# Patient Record
Sex: FEMALE | Race: NATIVE HAWAIIAN OR OTHER PACIFIC ISLANDER | Employment: FULL TIME | URBAN - METROPOLITAN AREA
[De-identification: names, ages, dates, MRNs, and addresses within clinical notes are randomized per-mention and may not be internally consistent; named-entity substitution may affect disease eponyms.]

---

## 2021-07-14 ENCOUNTER — APPOINTMENT (OUTPATIENT)
Dept: GENERAL RADIOLOGY | Age: 51
DRG: 243 | End: 2021-07-14
Payer: COMMERCIAL

## 2021-07-14 ENCOUNTER — HOSPITAL ENCOUNTER (INPATIENT)
Age: 51
LOS: 3 days | Discharge: HOME | DRG: 243 | End: 2021-07-17
Attending: EMERGENCY MEDICINE | Admitting: INTERNAL MEDICINE
Payer: COMMERCIAL

## 2021-07-14 ENCOUNTER — APPOINTMENT (OUTPATIENT)
Dept: CT IMAGING | Age: 51
DRG: 243 | End: 2021-07-14
Payer: COMMERCIAL

## 2021-07-14 DIAGNOSIS — R07.9 ACUTE CHEST PAIN: Primary | ICD-10-CM

## 2021-07-14 LAB
A/G RATIO: 1.2 (ref 1.1–2.2)
ALBUMIN SERPL-MCNC: 3.7 G/DL (ref 3.4–5)
ALP BLD-CCNC: 154 U/L (ref 40–129)
ALT SERPL-CCNC: 18 U/L (ref 10–40)
ANION GAP SERPL CALCULATED.3IONS-SCNC: 7 MMOL/L (ref 3–16)
AST SERPL-CCNC: 29 U/L (ref 15–37)
BASOPHILS ABSOLUTE: 0 K/UL (ref 0–0.2)
BASOPHILS RELATIVE PERCENT: 0.7 %
BILIRUB SERPL-MCNC: 0.3 MG/DL (ref 0–1)
BUN BLDV-MCNC: 10 MG/DL (ref 7–20)
CALCIUM SERPL-MCNC: 8.8 MG/DL (ref 8.3–10.6)
CHLORIDE BLD-SCNC: 107 MMOL/L (ref 99–110)
CO2: 26 MMOL/L (ref 21–32)
CREAT SERPL-MCNC: 0.6 MG/DL (ref 0.6–1.1)
D DIMER: 332 NG/ML DDU (ref 0–229)
EOSINOPHILS ABSOLUTE: 0.1 K/UL (ref 0–0.6)
EOSINOPHILS RELATIVE PERCENT: 1.3 %
GFR AFRICAN AMERICAN: >60
GFR NON-AFRICAN AMERICAN: >60
GLOBULIN: 3.2 G/DL
GLUCOSE BLD-MCNC: 113 MG/DL (ref 70–99)
HCT VFR BLD CALC: 37.7 % (ref 36–48)
HEMOGLOBIN: 11.9 G/DL (ref 12–16)
LYMPHOCYTES ABSOLUTE: 2.3 K/UL (ref 1–5.1)
LYMPHOCYTES RELATIVE PERCENT: 45.9 %
MCH RBC QN AUTO: 26.8 PG (ref 26–34)
MCHC RBC AUTO-ENTMCNC: 31.7 G/DL (ref 31–36)
MCV RBC AUTO: 84.8 FL (ref 80–100)
MONOCYTES ABSOLUTE: 0.6 K/UL (ref 0–1.3)
MONOCYTES RELATIVE PERCENT: 11.3 %
NEUTROPHILS ABSOLUTE: 2 K/UL (ref 1.7–7.7)
NEUTROPHILS RELATIVE PERCENT: 40.8 %
PDW BLD-RTO: 14 % (ref 12.4–15.4)
PLATELET # BLD: 178 K/UL (ref 135–450)
PMV BLD AUTO: 8.5 FL (ref 5–10.5)
POTASSIUM REFLEX MAGNESIUM: 3.9 MMOL/L (ref 3.5–5.1)
PRO-BNP: 198 PG/ML (ref 0–124)
RBC # BLD: 4.45 M/UL (ref 4–5.2)
SODIUM BLD-SCNC: 140 MMOL/L (ref 136–145)
TOTAL PROTEIN: 6.9 G/DL (ref 6.4–8.2)
TROPONIN: <0.01 NG/ML
WBC # BLD: 5 K/UL (ref 4–11)

## 2021-07-14 PROCEDURE — 99285 EMERGENCY DEPT VISIT HI MDM: CPT

## 2021-07-14 PROCEDURE — 71260 CT THORAX DX C+: CPT

## 2021-07-14 PROCEDURE — 85025 COMPLETE CBC W/AUTO DIFF WBC: CPT

## 2021-07-14 PROCEDURE — 93005 ELECTROCARDIOGRAM TRACING: CPT | Performed by: EMERGENCY MEDICINE

## 2021-07-14 PROCEDURE — 6360000004 HC RX CONTRAST MEDICATION: Performed by: EMERGENCY MEDICINE

## 2021-07-14 PROCEDURE — 84484 ASSAY OF TROPONIN QUANT: CPT

## 2021-07-14 PROCEDURE — 1200000000 HC SEMI PRIVATE

## 2021-07-14 PROCEDURE — 83880 ASSAY OF NATRIURETIC PEPTIDE: CPT

## 2021-07-14 PROCEDURE — 6370000000 HC RX 637 (ALT 250 FOR IP): Performed by: EMERGENCY MEDICINE

## 2021-07-14 PROCEDURE — 71045 X-RAY EXAM CHEST 1 VIEW: CPT

## 2021-07-14 PROCEDURE — 80053 COMPREHEN METABOLIC PANEL: CPT

## 2021-07-14 PROCEDURE — 85379 FIBRIN DEGRADATION QUANT: CPT

## 2021-07-14 PROCEDURE — 36415 COLL VENOUS BLD VENIPUNCTURE: CPT

## 2021-07-14 RX ORDER — NAPROXEN 500 MG/1
500 TABLET ORAL 2 TIMES DAILY WITH MEALS
COMMUNITY
End: 2021-09-23 | Stop reason: SDUPTHER

## 2021-07-14 RX ORDER — ASPIRIN 81 MG/1
324 TABLET, CHEWABLE ORAL ONCE
Status: COMPLETED | OUTPATIENT
Start: 2021-07-14 | End: 2021-07-14

## 2021-07-14 RX ORDER — LISINOPRIL 10 MG/1
10 TABLET ORAL DAILY
COMMUNITY
End: 2021-07-29 | Stop reason: SDUPTHER

## 2021-07-14 RX ADMIN — NITROGLYCERIN 1 INCH: 20 OINTMENT TOPICAL at 16:54

## 2021-07-14 RX ADMIN — ASPIRIN 324 MG: 81 TABLET, CHEWABLE ORAL at 16:54

## 2021-07-14 RX ADMIN — IOPAMIDOL 80 ML: 755 INJECTION, SOLUTION INTRAVENOUS at 18:08

## 2021-07-14 ASSESSMENT — HEART SCORE: ECG: 1

## 2021-07-14 ASSESSMENT — PAIN DESCRIPTION - PAIN TYPE
TYPE: ACUTE PAIN
TYPE: ACUTE PAIN

## 2021-07-14 ASSESSMENT — PAIN DESCRIPTION - LOCATION
LOCATION: CHEST
LOCATION: CHEST

## 2021-07-14 ASSESSMENT — PAIN SCALES - GENERAL
PAINLEVEL_OUTOF10: 6
PAINLEVEL_OUTOF10: 10

## 2021-07-14 ASSESSMENT — PAIN DESCRIPTION - DESCRIPTORS
DESCRIPTORS: CONSTANT
DESCRIPTORS: DISCOMFORT

## 2021-07-14 ASSESSMENT — PAIN DESCRIPTION - ORIENTATION: ORIENTATION: LEFT

## 2021-07-14 NOTE — ED PROVIDER NOTES
The [address] [place] [address] Emergency Department    CHIEF COMPLAINT  Chief Complaint   Patient presents with    Chest Pain      HISTORY OF PRESENT Yvette Barrett is a 45 y.o. female  who presents to the ED complaining of lightheadedness and chest pain intermittently for a month but constant all day today. She says it causes her to feel SOB. She may have passed out one time recently but not today. No fevers. No leg swelling currently but sometimes her hands/legs swell up. She has easy fatigability compared to normal the past month as well. H/o HTN, unsure if diabetic, and and has been told she has high cholesterol in the past.  Not really coughing or having abdominal pains, no vomiting or diarrhea. Describes the chest pain as central and left sided chest pressure without radiation. No other complaints, modifying factors or associated symptoms. I have reviewed the following from the nursing documentation. Past Medical History:   Diagnosis Date    Anemia     Hypertension      Past Surgical History:   Procedure Laterality Date    HYSTERECTOMY       History reviewed. No pertinent family history.   Social History     Socioeconomic History    Marital status: Single     Spouse name: Not on file    Number of children: Not on file    Years of education: Not on file    Highest education level: Not on file   Occupational History    Not on file   Tobacco Use    Smoking status: Never Smoker    Smokeless tobacco: Current User   Substance and Sexual Activity    Alcohol use: Not Currently    Drug use: Yes     Types: Marijuana    Sexual activity: Not on file   Other Topics Concern    Not on file   Social History Narrative    Not on file     Social Determinants of Health     Financial Resource Strain:     Difficulty of Paying Living Expenses:    Food Insecurity:     Worried About Running Out of Food in the Last Year:     [address] in the Last Year:    Transportation Needs:     Lack of Transportation (Medical):  Lack of Transportation (Non-Medical):    Physical Activity:     Days of Exercise per Week:     Minutes of Exercise per Session:    Stress:     Feeling of Stress :    Social Connections:     Frequency of Communication with Friends and Family:     Frequency of Social Gatherings with Friends and Family:     Attends Temple Services:     Active Member of Clubs or Organizations:     Attends Club or Organization Meetings:     Marital Status:    Intimate Partner Violence:     Fear of Current or Ex-Partner:     Emotionally Abused:     Physically Abused:     Sexually Abused:      No current facility-administered medications for this encounter. Current Outpatient Medications   Medication Sig Dispense Refill    lisinopril (PRINIVIL;ZESTRIL) 10 MG tablet Take 10 mg by mouth daily      naproxen (NAPROSYN) 500 MG tablet Take 500 mg by mouth 2 times daily (with meals)       No Known Allergies    REVIEW OF SYSTEMS  10 systems reviewed, pertinent positives per HPI otherwise noted to be negative. PHYSICAL EXAM  /81   Pulse 57   Temp 98.8 °F (37.1 °C) (Oral)   Resp 19   Ht 4' 9\" (1.448 m)   Wt 150 lb (68 kg)   SpO2 97%   BMI 32.46 kg/m²    GENERAL APPEARANCE: Awake and alert. Cooperative. No distress. HENT: Normocephalic. Atraumatic. Mucous membranes are dry. NECK: Supple. EYES: PERRL. EOM's grossly intact. HEART/CHEST: RRR. No murmurs. No chest wall tenderness. LUNGS: Respirations unlabored. CTAB. Good air exchange. Speaking comfortably in full sentences. ABDOMEN: No tenderness. Soft. Non-distended. No masses. No organomegaly. No guarding or rebound. Normal bowel sounds throughout. MUSCULOSKELETAL: No extremity edema. Compartments soft. No deformity. No tenderness in the extremities. All extremities neurovascularly intact. SKIN: Warm and dry. No acute rashes. NEUROLOGICAL: Alert and oriented. CN's 2-12 intact. No gross facial drooping. Strength 5/5, sensation intact. 2 plus DTR's in knees bilaterally. Gait normal.  PSYCHIATRIC: Normal mood and affect. LABS  I have reviewed all labs for this visit.    Results for orders placed or performed during the hospital encounter of 07/14/21   CBC Auto Differential   Result Value Ref Range    WBC 5.0 4.0 - 11.0 K/uL    RBC 4.45 4.00 - 5.20 M/uL    Hemoglobin 11.9 (L) 12.0 - 16.0 g/dL    Hematocrit 37.7 36.0 - 48.0 %    MCV 84.8 80.0 - 100.0 fL    MCH 26.8 26.0 - 34.0 pg    MCHC 31.7 31.0 - 36.0 g/dL    RDW 14.0 12.4 - 15.4 %    Platelets 794 208 - 497 K/uL    MPV 8.5 5.0 - 10.5 fL    Neutrophils % 40.8 %    Lymphocytes % 45.9 %    Monocytes % 11.3 %    Eosinophils % 1.3 %    Basophils % 0.7 %    Neutrophils Absolute 2.0 1.7 - 7.7 K/uL    Lymphocytes Absolute 2.3 1.0 - 5.1 K/uL    Monocytes Absolute 0.6 0.0 - 1.3 K/uL    Eosinophils Absolute 0.1 0.0 - 0.6 K/uL    Basophils Absolute 0.0 0.0 - 0.2 K/uL   Comprehensive Metabolic Panel w/ Reflex to MG   Result Value Ref Range    Sodium 140 136 - 145 mmol/L    Potassium reflex Magnesium 3.9 3.5 - 5.1 mmol/L    Chloride 107 99 - 110 mmol/L    CO2 26 21 - 32 mmol/L    Anion Gap 7 3 - 16    Glucose 113 (H) 70 - 99 mg/dL    BUN 10 7 - 20 mg/dL    CREATININE 0.6 0.6 - 1.1 mg/dL    GFR Non-African American >60 >60    GFR African American >60 >60    Calcium 8.8 8.3 - 10.6 mg/dL    Total Protein 6.9 6.4 - 8.2 g/dL    Albumin 3.7 3.4 - 5.0 g/dL    Albumin/Globulin Ratio 1.2 1.1 - 2.2    Total Bilirubin 0.3 0.0 - 1.0 mg/dL    Alkaline Phosphatase 154 (H) 40 - 129 U/L    ALT 18 10 - 40 U/L    AST 29 15 - 37 U/L    Globulin 3.2 g/dL   Troponin   Result Value Ref Range    Troponin <0.01 <0.01 ng/mL   Brain Natriuretic Peptide   Result Value Ref Range    Pro- (H) 0 - 124 pg/mL   D-Dimer, Quantitative   Result Value Ref Range    D-Dimer, Quant 332 (H) 0 - 229 ng/mL DDU   EKG 12 Lead   Result Value Ref Range    Ventricular Rate 60 BPM    Atrial Rate 60 BPM    P-R Interval 126 ms    QRS Duration 92 ms    Q-T Interval 436 ms    QTc Calculation (Bazett) 436 ms    P Axis -37 degrees    R Axis 17 degrees    T Axis 50 degrees    Diagnosis       Unusual P axis, possible ectopic atrial rhythmAbnormal ECG       The 12 lead EKG was interpreted by me as follows:  Rate: normal with a rate of 60  Rhythm: sinus (unusual p axis vs artifact)  Axis: normal  Intervals: normal MA, narrow QRS, normal QTc  ST segments: no ST elevations or depressions  T waves: no abnormal inversions  Non-specific T wave changes: present  Prior EKG comparison: No prior is currently available for comparison    RADIOLOGY    XR CHEST PORTABLE    Result Date: 7/14/2021  Chest portable. HISTORY: Chest pain. 1640 hours Heart size is normal No focal consolidation or edema. Artifact at the thoracic inlet. Degenerative change both shoulders     No consolidation    CT CHEST PULMONARY EMBOLISM W CONTRAST    Result Date: 7/14/2021  EXAM: CT ANGIOGRAPHY CHEST WITH CONTRAST (PULMONARY EMBOLUS PROTOCOL) INDICATION: CP SOB +ddimer, Chest pain COMPARISON: None. TECHNIQUE: Axial CT imaging obtained through the chest using CT pulmonary angiogram protocol. Axial images, multiplanar reformatted images and maximum intensity projection images were reviewed for CT angiographic technique. Individualized dose optimization technique was used in order to meet ALARA standards for radiation dose reduction. In addition to vendor specific dose reduction algorithms, the dose reduction techniques vary based on the specific scanner utilized but frequently include automated exposure control, adjustment of the mA and/or kV according to patient size, and use of iterative reconstruction technique. IV Contrast Media and volume: 80 mL Isovue-370 FINDINGS: DIAGNOSTIC QUALITY: Adequate. PULMONARY EMBOLI: None. RIGHT HEART STRAIN: None. LUNGS AND AIRWAYS: Airways are patent. Patchy groundglass opacities in bilateral lower lobes.  PLEURA: No pleural effusions or significant pleural thickening. HEART / GREAT VESSELS: Normal heart size. No pericardial effusion. Great vessels are normal caliber. ADENOPATHY: None. CHEST WALL / LOWER NECK: No significant abnormality. UPPER ABDOMEN: No acute abnormality. Yazmin Sharper BONES: No destructive process. Mild degenerative changes spine. 1. No evidence of pulmonary embolus. 2. Patchy groundglass opacities in bilateral lower lobes which may relate to atelectasis or pneumonitis. ED COURSE/MDM  Patient seen and evaluated. Old records reviewed. Labs and imaging reviewed and results discussed with patient. After initial evaluation, differential diagnostic considerations included: acute coronary syndrome, pulmonary embolism, COPD/asthma, pneumonia, musculoskeletal, reflux/PUD/gastritis, pneumothorax, CHF, thoracic aortic dissection, anxiety    The patient's ED workup was notable for concern for intermittent but progressive chest discomfort with lightheadedness and some shortness of breath. Her symptomatology is not really consistent with an acute infectious process. Her D-dimer is mildly elevated but chest x-ray is clear. Subsequent CT scan shows no PE. There is patchy groundglass opacities in the bilateral lower lobes which is likely atelectasis, initial troponin negative, EKG nonspecific. Will admit based on elevated heart score. Symptoms improved with nitroglycerin and aspirin, furthering concern for anginal etiology. HEART SCORE:    History: 2  EC  Patient Age: 1  *Risk factors for Atherosclerotic disease: Hypertension; Hypercholesterolemia;Obesity  Risk Factors: 2  Troponin: 0  Heart Score Total: 6      Heart score: 6.   This falls under the following category: Score of 4-6, which indicates low/moderate risk for major adverse cardiac event and supports observation with repeated troponins and/or non-invasive testing      During the patient's ED course, the patient was given:  Medications   aspirin chewable tablet 324 mg (324 mg Oral Given 7/14/21 1654)   nitroglycerin (NITRO-BID) 2 % ointment 1 inch (1 inch Topical Given 7/14/21 1654)   iopamidol (ISOVUE-370) 76 % injection 80 mL (80 mLs Intravenous Given 7/14/21 1808)        CLINICAL IMPRESSION  1. Acute chest pain        Blood pressure 121/81, pulse 57, temperature 98.8 °F (37.1 °C), temperature source Oral, resp. rate 19, height 4' 9\" (1.448 m), weight 150 lb (68 kg), SpO2 97 %. Kaushal Barrett was admitted in fair condition. The plan is to admit to the hospital at this time under the hospitalist service. Dr. Homer Quintero, hospitalist accepted the patient and will take over the patient's care. DISCLAIMER: This chart was created using Dragon dictation software. Efforts were made by me to ensure accuracy, however some errors may be present due to limitations of this technology and occasionally words are not transcribed correctly.         Maya Coello MD  07/14/21 Natalia Amaro MD  07/14/21 [id number]

## 2021-07-14 NOTE — LETTER
[facility]  [address]  Phone: [phone]    No name on file. July 17, 2021     Patient: Camelia Clemons   YOB: 1970   Date of Visit: 7/14/2021       To Whom It May Concern: It is my medical opinion that Charmaine Mccrary may return to light duty immediately with the following restrictions: lifting/carrying not to exceed 10 lbs. for at least 1 month and until restriction lifted by her doctor/ medical advice. If you have any questions or concerns, please don't hesitate to call.       Sincerely,        Amy Short MD

## 2021-07-14 NOTE — ED NOTES
Pt having long pauses on monitor and when RN went to check on pt she states that at this time she is pain free although still feels sob. Will make MD aware.      Luis Crooks RN  07/14/21 9857

## 2021-07-14 NOTE — ED NOTES
Pt states that she has been having chest pain and sob for the past 2 weeks along with dizziness and feeling like she was going to pass out. Pt states that it happens more when she is at work and does have a history of anemia and a heart murmer. Pt denies any nausea at this time.      Erum Purdy RN  07/14/21 [id number]

## 2021-07-15 PROBLEM — R00.1 BRADYCARDIA: Status: ACTIVE | Noted: 2021-07-15

## 2021-07-15 LAB
A/G RATIO: 0.9 (ref 1.1–2.2)
ALBUMIN SERPL-MCNC: 3.3 G/DL (ref 3.4–5)
ALP BLD-CCNC: 163 U/L (ref 40–129)
ALT SERPL-CCNC: 18 U/L (ref 10–40)
ANION GAP SERPL CALCULATED.3IONS-SCNC: 7 MMOL/L (ref 3–16)
AST SERPL-CCNC: 25 U/L (ref 15–37)
BASOPHILS ABSOLUTE: 0 K/UL (ref 0–0.2)
BASOPHILS RELATIVE PERCENT: 0.7 %
BILIRUB SERPL-MCNC: <0.2 MG/DL (ref 0–1)
BUN BLDV-MCNC: 11 MG/DL (ref 7–20)
C-REACTIVE PROTEIN: <3 MG/L (ref 0–5.1)
CALCIUM SERPL-MCNC: 8.3 MG/DL (ref 8.3–10.6)
CHLORIDE BLD-SCNC: 106 MMOL/L (ref 99–110)
CHOLESTEROL, TOTAL: 189 MG/DL (ref 0–199)
CO2: 26 MMOL/L (ref 21–32)
CREAT SERPL-MCNC: 0.7 MG/DL (ref 0.6–1.1)
EKG ATRIAL RATE: 60 BPM
EKG ATRIAL RATE: 63 BPM
EKG DIAGNOSIS: NORMAL
EKG DIAGNOSIS: NORMAL
EKG P AXIS: -37 DEGREES
EKG P-R INTERVAL: 126 MS
EKG P-R INTERVAL: 144 MS
EKG Q-T INTERVAL: 436 MS
EKG Q-T INTERVAL: 444 MS
EKG QRS DURATION: 100 MS
EKG QRS DURATION: 92 MS
EKG QTC CALCULATION (BAZETT): 436 MS
EKG QTC CALCULATION (BAZETT): 454 MS
EKG R AXIS: 17 DEGREES
EKG R AXIS: 26 DEGREES
EKG T AXIS: 50 DEGREES
EKG T AXIS: 59 DEGREES
EKG VENTRICULAR RATE: 60 BPM
EKG VENTRICULAR RATE: 63 BPM
EOSINOPHILS ABSOLUTE: 0.1 K/UL (ref 0–0.6)
EOSINOPHILS RELATIVE PERCENT: 2 %
FERRITIN: 130.6 NG/ML (ref 15–150)
FOLATE: 6.93 NG/ML (ref 4.78–24.2)
GAMMA GLUTAMYL TRANSFERASE: 31 U/L (ref 5–36)
GFR AFRICAN AMERICAN: >60
GFR NON-AFRICAN AMERICAN: >60
GLOBULIN: 3.6 G/DL
GLUCOSE BLD-MCNC: 118 MG/DL (ref 70–99)
HCG QUALITATIVE: NEGATIVE
HCT VFR BLD CALC: 36.9 % (ref 36–48)
HDLC SERPL-MCNC: 54 MG/DL (ref 40–60)
HEMOGLOBIN: 12.1 G/DL (ref 12–16)
IRON SATURATION: 35 % (ref 15–50)
IRON: 86 UG/DL (ref 37–145)
LDL CHOLESTEROL CALCULATED: 103 MG/DL
LYMPHOCYTES ABSOLUTE: 2.1 K/UL (ref 1–5.1)
LYMPHOCYTES RELATIVE PERCENT: 42.2 %
MAGNESIUM: 2.1 MG/DL (ref 1.8–2.4)
MCH RBC QN AUTO: 28 PG (ref 26–34)
MCHC RBC AUTO-ENTMCNC: 32.8 G/DL (ref 31–36)
MCV RBC AUTO: 85.4 FL (ref 80–100)
MONOCYTES ABSOLUTE: 0.5 K/UL (ref 0–1.3)
MONOCYTES RELATIVE PERCENT: 10.8 %
NEUTROPHILS ABSOLUTE: 2.2 K/UL (ref 1.7–7.7)
NEUTROPHILS RELATIVE PERCENT: 44.3 %
PDW BLD-RTO: 14.1 % (ref 12.4–15.4)
PHOSPHORUS: 3.3 MG/DL (ref 2.5–4.9)
PLATELET # BLD: 167 K/UL (ref 135–450)
PMV BLD AUTO: 8.6 FL (ref 5–10.5)
POTASSIUM REFLEX MAGNESIUM: 3.8 MMOL/L (ref 3.5–5.1)
PROCALCITONIN: 0.03 NG/ML (ref 0–0.15)
RBC # BLD: 4.33 M/UL (ref 4–5.2)
SARS-COV-2, NAAT: NOT DETECTED
SEDIMENTATION RATE, ERYTHROCYTE: >120 MM/HR (ref 0–30)
SODIUM BLD-SCNC: 139 MMOL/L (ref 136–145)
TOTAL IRON BINDING CAPACITY: 249 UG/DL (ref 260–445)
TOTAL PROTEIN: 6.9 G/DL (ref 6.4–8.2)
TRIGL SERPL-MCNC: 162 MG/DL (ref 0–150)
TROPONIN: <0.01 NG/ML
TSH REFLEX: 2.5 UIU/ML (ref 0.27–4.2)
VITAMIN B-12: 324 PG/ML (ref 211–911)
VLDLC SERPL CALC-MCNC: 32 MG/DL
WBC # BLD: 5 K/UL (ref 4–11)

## 2021-07-15 PROCEDURE — 93010 ELECTROCARDIOGRAM REPORT: CPT | Performed by: INTERNAL MEDICINE

## 2021-07-15 PROCEDURE — 6370000000 HC RX 637 (ALT 250 FOR IP): Performed by: INTERNAL MEDICINE

## 2021-07-15 PROCEDURE — 82746 ASSAY OF FOLIC ACID SERUM: CPT

## 2021-07-15 PROCEDURE — 84100 ASSAY OF PHOSPHORUS: CPT

## 2021-07-15 PROCEDURE — 99254 IP/OBS CNSLTJ NEW/EST MOD 60: CPT | Performed by: INTERNAL MEDICINE

## 2021-07-15 PROCEDURE — 84145 PROCALCITONIN (PCT): CPT

## 2021-07-15 PROCEDURE — 36415 COLL VENOUS BLD VENIPUNCTURE: CPT

## 2021-07-15 PROCEDURE — 94150 VITAL CAPACITY TEST: CPT

## 2021-07-15 PROCEDURE — 80053 COMPREHEN METABOLIC PANEL: CPT

## 2021-07-15 PROCEDURE — 2580000003 HC RX 258: Performed by: INTERNAL MEDICINE

## 2021-07-15 PROCEDURE — 85652 RBC SED RATE AUTOMATED: CPT

## 2021-07-15 PROCEDURE — 93005 ELECTROCARDIOGRAM TRACING: CPT | Performed by: INTERNAL MEDICINE

## 2021-07-15 PROCEDURE — 82607 VITAMIN B-12: CPT

## 2021-07-15 PROCEDURE — 82977 ASSAY OF GGT: CPT

## 2021-07-15 PROCEDURE — 6360000002 HC RX W HCPCS: Performed by: INTERNAL MEDICINE

## 2021-07-15 PROCEDURE — 84443 ASSAY THYROID STIM HORMONE: CPT

## 2021-07-15 PROCEDURE — 80061 LIPID PANEL: CPT

## 2021-07-15 PROCEDURE — 83540 ASSAY OF IRON: CPT

## 2021-07-15 PROCEDURE — 82728 ASSAY OF FERRITIN: CPT

## 2021-07-15 PROCEDURE — 83550 IRON BINDING TEST: CPT

## 2021-07-15 PROCEDURE — 85025 COMPLETE CBC W/AUTO DIFF WBC: CPT

## 2021-07-15 PROCEDURE — 86140 C-REACTIVE PROTEIN: CPT

## 2021-07-15 PROCEDURE — 2060000000 HC ICU INTERMEDIATE R&B

## 2021-07-15 PROCEDURE — 83036 HEMOGLOBIN GLYCOSYLATED A1C: CPT

## 2021-07-15 PROCEDURE — 87635 SARS-COV-2 COVID-19 AMP PRB: CPT

## 2021-07-15 PROCEDURE — 83735 ASSAY OF MAGNESIUM: CPT

## 2021-07-15 PROCEDURE — 84703 CHORIONIC GONADOTROPIN ASSAY: CPT

## 2021-07-15 PROCEDURE — 84484 ASSAY OF TROPONIN QUANT: CPT

## 2021-07-15 RX ORDER — SODIUM CHLORIDE 0.9 % (FLUSH) 0.9 %
10 SYRINGE (ML) INJECTION EVERY 12 HOURS SCHEDULED
Status: DISCONTINUED | OUTPATIENT
Start: 2021-07-15 | End: 2021-07-17 | Stop reason: HOSPADM

## 2021-07-15 RX ORDER — ATROPINE SULFATE 0.4 MG/ML
0.4 AMPUL (ML) INJECTION
Status: DISPENSED | OUTPATIENT
Start: 2021-07-15 | End: 2021-07-15

## 2021-07-15 RX ORDER — SODIUM CHLORIDE 0.9 % (FLUSH) 0.9 %
10 SYRINGE (ML) INJECTION PRN
Status: DISCONTINUED | OUTPATIENT
Start: 2021-07-15 | End: 2021-07-17 | Stop reason: HOSPADM

## 2021-07-15 RX ORDER — ACETAMINOPHEN 650 MG/1
650 SUPPOSITORY RECTAL EVERY 6 HOURS PRN
Status: DISCONTINUED | OUTPATIENT
Start: 2021-07-15 | End: 2021-07-17 | Stop reason: HOSPADM

## 2021-07-15 RX ORDER — ONDANSETRON 2 MG/ML
4 INJECTION INTRAMUSCULAR; INTRAVENOUS EVERY 6 HOURS PRN
Status: DISCONTINUED | OUTPATIENT
Start: 2021-07-15 | End: 2021-07-17 | Stop reason: HOSPADM

## 2021-07-15 RX ORDER — PROMETHAZINE HYDROCHLORIDE 12.5 MG/1
12.5 TABLET ORAL EVERY 6 HOURS PRN
Status: DISCONTINUED | OUTPATIENT
Start: 2021-07-15 | End: 2021-07-17 | Stop reason: HOSPADM

## 2021-07-15 RX ORDER — LISINOPRIL 10 MG/1
10 TABLET ORAL DAILY
Status: DISCONTINUED | OUTPATIENT
Start: 2021-07-15 | End: 2021-07-17 | Stop reason: HOSPADM

## 2021-07-15 RX ORDER — MAGNESIUM SULFATE IN WATER 40 MG/ML
2000 INJECTION, SOLUTION INTRAVENOUS PRN
Status: DISCONTINUED | OUTPATIENT
Start: 2021-07-15 | End: 2021-07-17 | Stop reason: HOSPADM

## 2021-07-15 RX ORDER — ACETAMINOPHEN 325 MG/1
650 TABLET ORAL EVERY 6 HOURS PRN
Status: DISCONTINUED | OUTPATIENT
Start: 2021-07-15 | End: 2021-07-17 | Stop reason: HOSPADM

## 2021-07-15 RX ORDER — MORPHINE SULFATE 2 MG/ML
2 INJECTION, SOLUTION INTRAMUSCULAR; INTRAVENOUS EVERY 4 HOURS PRN
Status: DISCONTINUED | OUTPATIENT
Start: 2021-07-15 | End: 2021-07-17 | Stop reason: HOSPADM

## 2021-07-15 RX ORDER — NITROGLYCERIN 0.4 MG/1
0.4 TABLET SUBLINGUAL EVERY 5 MIN PRN
Status: DISCONTINUED | OUTPATIENT
Start: 2021-07-15 | End: 2021-07-17 | Stop reason: HOSPADM

## 2021-07-15 RX ORDER — ASPIRIN 81 MG/1
81 TABLET, CHEWABLE ORAL DAILY
Status: DISCONTINUED | OUTPATIENT
Start: 2021-07-15 | End: 2021-07-17 | Stop reason: HOSPADM

## 2021-07-15 RX ORDER — SODIUM CHLORIDE 9 MG/ML
25 INJECTION, SOLUTION INTRAVENOUS PRN
Status: DISCONTINUED | OUTPATIENT
Start: 2021-07-15 | End: 2021-07-16 | Stop reason: SDUPTHER

## 2021-07-15 RX ORDER — SODIUM CHLORIDE 9 MG/ML
INJECTION, SOLUTION INTRAVENOUS CONTINUOUS
Status: ACTIVE | OUTPATIENT
Start: 2021-07-15 | End: 2021-07-15

## 2021-07-15 RX ORDER — BENZONATATE 100 MG/1
200 CAPSULE ORAL 3 TIMES DAILY
Status: DISCONTINUED | OUTPATIENT
Start: 2021-07-15 | End: 2021-07-17 | Stop reason: HOSPADM

## 2021-07-15 RX ORDER — POTASSIUM CHLORIDE 7.45 MG/ML
10 INJECTION INTRAVENOUS PRN
Status: DISCONTINUED | OUTPATIENT
Start: 2021-07-15 | End: 2021-07-17 | Stop reason: HOSPADM

## 2021-07-15 RX ADMIN — Medication 10 ML: at 09:41

## 2021-07-15 RX ADMIN — Medication 10 ML: at 21:45

## 2021-07-15 RX ADMIN — ACETAMINOPHEN 650 MG: 325 TABLET, FILM COATED ORAL at 02:04

## 2021-07-15 RX ADMIN — BENZONATATE 200 MG: 100 CAPSULE ORAL at 09:41

## 2021-07-15 RX ADMIN — LISINOPRIL 10 MG: 10 TABLET ORAL at 09:41

## 2021-07-15 RX ADMIN — ASPIRIN 81 MG: 81 TABLET, CHEWABLE ORAL at 11:47

## 2021-07-15 RX ADMIN — BENZONATATE 200 MG: 100 CAPSULE ORAL at 20:28

## 2021-07-15 RX ADMIN — BENZONATATE 200 MG: 100 CAPSULE ORAL at 02:02

## 2021-07-15 RX ADMIN — ENOXAPARIN SODIUM 40 MG: 40 INJECTION SUBCUTANEOUS at 05:03

## 2021-07-15 RX ADMIN — SODIUM CHLORIDE: 9 INJECTION, SOLUTION INTRAVENOUS at 11:47

## 2021-07-15 ASSESSMENT — PAIN SCALES - GENERAL
PAINLEVEL_OUTOF10: 2
PAINLEVEL_OUTOF10: 0

## 2021-07-15 NOTE — PROGRESS NOTES
MITCH Attending    Seen and admitted earlier today by my colleague, Dr. Gilbert Comer    Pxt seen and examined    Chart and data reviewed      Admitted with chest pain    Works at [address] had intermittent exertional left precordial chest pain with associated dizziness and dyspnea    No smoking hx  Started on lisinopril few months ago at [facility] for HTN  Family hx of heart disease, but no early cardiac death hx    EKG shows no acute ST- T wave changes    Has also been noted with intermittent bradycardia, with HR down to the 30s,  apparent  junctional rhythm with at least one episode of pause 3.32 sec. Unknown if she was symptomatic, but admits to having had episodes of dizziness since admission. Ruled out for Acute MI    TSH: Pending  Bed rest for now  Avoid AVN agents  Atropine PRN    Hold ACEI for now. Avoid hypotension  Hydrate. COVID testing  Stress test ordered:  Will change to nuclear stress test and obtain echo  Cardiology (EP) eval.    Rest as per admit note    Discussed with OMA Segal MD

## 2021-07-15 NOTE — PROGRESS NOTES
4 Eyes Admission Assessment     I agree as the admission nurse that 2 RN's have performed a thorough Head to Toe Skin Assessment on the patient. ALL assessment sites listed below have been assessed on admission. Areas assessed by both nurses:   [x]   Head, Face, and Ears   [x]   Shoulders, Back, and Chest  [x]   Arms, Elbows, and Hands   [x]   Coccyx, Sacrum, and Ischium  [x]   Legs, Feet, and Heels        Does the Patient have Skin Breakdown? No         Eric Prevention initiated:  No   Wound Care Orders initiated:  No      [facility] nurse consulted for Pressure Injury (Stage 3,4, Unstageable, DTI, NWPT, and Complex wounds) or Eric score 18 or lower:  No    Pt has scattered bruises.      Nurse 1 eSignature: Electronically signed by Aldo Jacobo RN on 7/15/21 at 1:37 AM EDT    **SHARE this note so that the co-signing nurse is able to place an eSignature**    Nurse 2 eSignature: Electronically signed by Hiren Sharma RN on 7/15/21 at 1:34 AM EDT

## 2021-07-15 NOTE — PROGRESS NOTES
Pt admitted to room 6322. Pt AxOx4 and VSS. Pt oriented to room and call light. Pt placed on tele per orders. Pt placed on fall precautions, with bed alarm on per protocol. Pt skin and admission assessment complete. Pt instructed to call for any further needs. Pt verbalized understanding. Will continue to monitor.

## 2021-07-15 NOTE — PROGRESS NOTES
HR dropping into 30s junctional rhythm and flipping back to SB in 50s very frequently this hour. Pt awake in bed, denies dizziness/ chest pain. States she only episode of dizziness this AM. Hospitalist notified. 1x prn atropine ordered for symptomatic bradycardia or sustaining in 30s.     Electronically signed by Dilip Lujan RN on 1/53/1614 at 4:13 PM

## 2021-07-15 NOTE — PLAN OF CARE
Problem: Falls - Risk of:  Goal: Will remain free from falls  Description: Will remain free from falls  Outcome: Ongoing  Goal: Absence of physical injury  Description: Absence of physical injury  Outcome: Ongoing     Problem: Pain:  Goal: Pain level will decrease  Description: Pain level will decrease  Outcome: Ongoing  Goal: Control of acute pain  Description: Control of acute pain  Outcome: Ongoing  Goal: Control of chronic pain  Description: Control of chronic pain  Outcome: Ongoing     Problem: Isolation Precautions - Risk of Spread of Infection  Goal: Prevent transmission of infection  Outcome: Ongoing

## 2021-07-15 NOTE — CONSULTS
History of Present Illness:  Molly Evans is a 45 y.o. patient whom we were asked to see for CP/bradycardia. Intermittent chest pain over past 2-3 months. Difficult to describe. Occurs unpredictably. Associated with some sob. Noted had persistent cp after running up stairs at work. Noted dizzy and sob. Presented to ER with same. Has been noting cough and sputum over past week. CT with pulmonary finding of groundglass opacities,  Now covid negative. Noted here to have periods of bradycardia into 30s and also pauses up to 3.2 seconds. No previous cardiac hx. She says again notes periodic light headed spells. Unpredictably. May have had l episode of syncope some time back. Past Medical History:   has a past medical history of Anemia and Hypertension. Surgical History:   has a past surgical history that includes Hysterectomy. Social History:   reports that she has never smoked. She uses smokeless tobacco. She reports previous alcohol use. She reports current drug use. Drug: Marijuana. Family History:  No evidence for sudden cardiac death or premature CAD    Home Medications:  Were reviewed and are listed in nursing record. and/or listed below  Prior to Admission medications    Medication Sig Start Date End Date Taking? Authorizing Provider   lisinopril (PRINIVIL;ZESTRIL) 10 MG tablet Take 10 mg by mouth daily   Yes Historical Provider, MD   naproxen (NAPROSYN) 500 MG tablet Take 500 mg by mouth 2 times daily (with meals)   Yes Historical Provider, MD        Allergies:  Patient has no known allergies. Review of Systems:       · Constitutional: there has been no unanticipated weight loss. There's been no change in energy level, sleep pattern, or activity level. · Eyes: No visual changes or diplopia. No scleral icterus. · ENT: No Headaches, hearing loss or vertigo. No mouth sores or sore throat. · Cardiovascular: Nohemoptysis, pleuritic pain, or phlebitis.   · Respiratory: No wheezing, no sputum production. No hematemesis. Cough as above. · Gastrointestinal: No abdominal pain, appetite loss, blood in stools. No change in bowel or bladder habits. · Genitourinary: No dysuria, trouble voiding, or hematuria. · Musculoskeletal:  No gait disturbance, weakness or joint complaints. · Integumentary: No rash or pruritis. · All other systems reviewed negative as done.    Physical Examination:    Vitals:    07/15/21 1122   BP: (!) 151/89   Pulse: 64   Resp: 16   Temp: 97.1 °F (36.2 °C)   SpO2: 99%    Weight: 150 lb (68 kg)         General Appearance:  Alert, cooperative, no distress, appears stated age   Head:  Normocephalic, without obvious abnormality, atraumatic   Eyes:  PERRL, conjunctiva/corneas clear       Nose: Nares normal, no drainage or sinus tenderness   Throat: Lips, mucosa, and tongue normal   Neck: Supple, symmetrical, trachea midline       Lungs:   Clear to auscultation bilaterally, respirations unlabored   Chest Wall:  No tenderness or deformity   Heart:  Regular rate and rhythm, S1, S2 normal, no murmur, rub or gallop   Abdomen:   Soft, non-tender, bowel sounds active all four quadrants           Extremities: Extremities normal, atraumatic, no cyanosis or edema       Skin: Skin color, texture, turgor normal, no rashes or lesions   Pysch: Normal mood and affect   Neurologic: Normal gross motor and sensory exam.         Labs  CBC:   Lab Results   Component Value Date    WBC 5.0 07/15/2021    RBC 4.33 07/15/2021    HGB 12.1 07/15/2021    HCT 36.9 07/15/2021    MCV 85.4 07/15/2021    RDW 14.1 07/15/2021     07/15/2021     CMP:    Lab Results   Component Value Date     07/15/2021    K 3.8 07/15/2021     07/15/2021    CO2 26 07/15/2021    BUN 11 07/15/2021    CREATININE 0.7 07/15/2021    GFRAA >60 07/15/2021    AGRATIO 0.9 07/15/2021    LABGLOM >60 07/15/2021    GLUCOSE 118 07/15/2021    PROT 6.9 07/15/2021    CALCIUM 8.3 07/15/2021    BILITOT <0.2 07/15/2021

## 2021-07-15 NOTE — ED NOTES
Report called to Desmond Diaz RN for rm 2162 and informed of concepcion carrillo.       Richard Rivera RN  07/14/21 4727

## 2021-07-15 NOTE — H&P
Hospital Medicine History & Physical      PCP: No primary care provider on file. Date of Admission: 7/14/2021    Date of Service: Pt seen/examined on 07/15/2021  Pt seen/examined face to face on and admitted as inpatient with expected LOS greater than two midnights due to medical therapy    Chief Complaint:    Chief Complaint   Patient presents with    Chest Pain        History Of Present Illness:      45 y.o. female who presented to [facility] with past medical history of anemia, hypertension, class I obesity presented to the ED for chest pain. 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 Patient reports that daily she requires to go up at least 12 flight of stairs and then turn to take another 3 steps. Patient reports that she noted that today after she sprinted up the 12 stairs the patient became dizzy short of breath and had chest pain that is left-sided nonradiating alleviated with rest nonexertional.  Patient reports that she normally is able to do that however does take breaks between the 12 steps stairs and the three-step stairs. Patient reported the chest pain has been consistently going on for a month but today was constant and which is why she came to the ED for further evaluation. Patient also reports that she been having cough with phlegm production that has been almost a week and a half. Patient denied having any active Covid exposure but also denies being vaccinated. In the ED I requested patient was transferred for chest pain. Given patient's history I was suspicious of COVID-19 thus transferred patient for further evaluation. Given CT findings showing patchy groundglass opacities bilateral lower lobes and elevated D-dimer with respiratory symptoms. It was also reported to me by nursing staff the patient was bradycardic dipping down to the 30s.       Past Medical History:          Diagnosis Date    Anemia     Hypertension        Past respiratory effort. bilaterally without wheezing, minimal rhonchi bilaterally basilar  Cardiovascular:  Regular rate and rhythm, capillary refill 2 seconds  Abdomen: Obese, soft, non-tender, non-distended with normal bowel sounds. Musculoskeletal:  No clubbing, cyanosis. trace edema LE bilaterally. Skin: turgor normal.  No new rashes or lesions. Neurologic: Alert and oriented x4, no new focal sensory/motor deficits. Labs:     Recent Labs     07/14/21  1704   WBC 5.0   HGB 11.9*   HCT 37.7        Recent Labs     07/14/21  1709      K 3.9      CO2 26   BUN 10   CREATININE 0.6   CALCIUM 8.8     Recent Labs     07/14/21  1709   AST 29   ALT 18   BILITOT 0.3   ALKPHOS 154*     No results for input(s): INR in the last 72 hours. Recent Labs     07/14/21  1709 07/15/21  0108   TROPONINI <0.01 <0.01       Urinalysis:    No results found for: [facility], BACTERIA, RBCUA, BLOODU, [address], GLUCOSEU    Radiology:     CXR: I have reviewed the CXR with the following interpretation:   No acute process  EKG:  I have reviewed the EKG with the following interpretation:   Normal sinus rhythm with     CT CHEST PULMONARY EMBOLISM W CONTRAST   Final Result      1. No evidence of pulmonary embolus. 2. Patchy groundglass opacities in bilateral lower lobes which may relate to atelectasis or pneumonitis.          XR CHEST PORTABLE   Final Result      No consolidation          ASSESSMENT AND PLAN:    Active Hospital Problems    Diagnosis Date Noted    Chest pain [R07.9] 07/14/2021     Atypical chest pain:  A1c, lipid panel for stratification  Pretest CAD probability low risk  Stress echo ordered  Troponin negative x2    Bilateral groundglass opacities:  Suspected COVID-19, not vaccinated new onset respiratory symptoms 1.5 weeks ago  Precautions, continue to monitor  incentive spirometry    Normocytic anemia:  Patient had a hysterectomy unclear if partial reported  Hemoccult, iron panel ordered    ALP:  GGT pending     Bilateral distal upper extremity numbness/tingling:  Patient reported glovelike distribution of numbness  Ordered TSH, B12 and folate    Chronic medical conditions:  Essential Hypertension: Continue home medication  Class I obesity: Complicating assessment and treatment. Placing patient at risk for multiple co-morbidities as well as early death and contributing to the patient's presentation.  Education, and counseling    Diet: NPO except meds ordered    DVT Prophylaxis: lovenox    Dispo:   Expected LOS greater than two [facility],

## 2021-07-15 NOTE — ED NOTES
Pt to [facility] in stable condition per [facility] ambulance.       Augie Mitchell RN  07/15/21 0010

## 2021-07-15 NOTE — PROGRESS NOTES
Patient noticed to be dropping heart rate into the low 30s with junctional beats at times. Dr Berny Lovelace made aware. STAT EKG ordered.

## 2021-07-15 NOTE — CARE COORDINATION
Case Management Assessment           Initial Evaluation                Date / Time of Evaluation: 7/15/2021 3:27 PM                 Assessment Completed by: Viviana Ratliff RN    Patient Name: Alma Rosa Gomez     YOB: 1970  Diagnosis: Chest pain [R07.9]     Date / Time: 7/14/2021  3:58 PM    Patient Admission Status: Inpatient    If patient is discharged prior to next notation, then this note serves as note for discharge by case management. Current PCP: No primary care provider on file. Clinic Patient: No    Chart Reviewed: Yes  Patient/ Family Interviewed: Yes    Initial assessment completed at bedside with: patient    Hospitalization in the last 30 days: No    Emergency Contacts:  Extended Emergency Contact Information  Primary Emergency Contact: scooby garcia  Home Phone: [phone]  Relation: Child  Preferred language: English   needed? No    Advance Directives:   Code Status: Full Code      Financial  Payor: [employer] / Plan: [employer] CHOICE [facility] [place] EMP / Product Type: *No Product type* /     Pre-cert required for SNF: Yes    Pharmacy    [facility] [address] [place] [address] [phone] [facility] [place] [phone]  [address]  Phone: [phone] Fax: [phone]      Potential assistance Purchasing Medications: Potential Assistance Purchasing Medications: No  Does Patient want to participate in local refill/ meds to beds program?: Yes    Meds To Beds General Rules:  1. Can ONLY be done Monday- Friday between 8:30am-5pm  2. Prescription(s) must be in pharmacy by 3pm to be filled same day  3. Copy of patient's insurance/ prescription drug card and patient face sheet must be sent along with the prescription(s)  4. Cost of Rx cannot be added to hospital bill. If financial assistance is needed, please contact unit  or ;  or  CANNOT provide pharmacy voucher for patients co-pays  5.

## 2021-07-16 ENCOUNTER — ANESTHESIA EVENT (OUTPATIENT)
Dept: CARDIAC CATH/INVASIVE PROCEDURES | Age: 51
DRG: 243 | End: 2021-07-16
Payer: COMMERCIAL

## 2021-07-16 ENCOUNTER — NURSE ONLY (OUTPATIENT)
Dept: CARDIOLOGY CLINIC | Age: 51
End: 2021-07-16

## 2021-07-16 ENCOUNTER — APPOINTMENT (OUTPATIENT)
Dept: CARDIAC CATH/INVASIVE PROCEDURES | Age: 51
DRG: 243 | End: 2021-07-16
Payer: COMMERCIAL

## 2021-07-16 ENCOUNTER — ANESTHESIA (OUTPATIENT)
Dept: CARDIAC CATH/INVASIVE PROCEDURES | Age: 51
DRG: 243 | End: 2021-07-16
Payer: COMMERCIAL

## 2021-07-16 VITALS — OXYGEN SATURATION: 100 % | SYSTOLIC BLOOD PRESSURE: 135 MMHG | DIASTOLIC BLOOD PRESSURE: 73 MMHG

## 2021-07-16 LAB
A/G RATIO: 0.9 (ref 1.1–2.2)
ALBUMIN SERPL-MCNC: 3.4 G/DL (ref 3.4–5)
ALP BLD-CCNC: 159 U/L (ref 40–129)
ALT SERPL-CCNC: 21 U/L (ref 10–40)
ANION GAP SERPL CALCULATED.3IONS-SCNC: 7 MMOL/L (ref 3–16)
AST SERPL-CCNC: 32 U/L (ref 15–37)
BASOPHILS ABSOLUTE: 0 K/UL (ref 0–0.2)
BASOPHILS RELATIVE PERCENT: 0.8 %
BILIRUB SERPL-MCNC: 0.5 MG/DL (ref 0–1)
BUN BLDV-MCNC: 12 MG/DL (ref 7–20)
CALCIUM SERPL-MCNC: 8.5 MG/DL (ref 8.3–10.6)
CHLORIDE BLD-SCNC: 108 MMOL/L (ref 99–110)
CO2: 25 MMOL/L (ref 21–32)
CREAT SERPL-MCNC: 0.8 MG/DL (ref 0.6–1.1)
EOSINOPHILS ABSOLUTE: 0.1 K/UL (ref 0–0.6)
EOSINOPHILS RELATIVE PERCENT: 1.6 %
ESTIMATED AVERAGE GLUCOSE: 108.3 MG/DL
ESTIMATED AVERAGE GLUCOSE: 111.2 MG/DL
GFR AFRICAN AMERICAN: >60
GFR NON-AFRICAN AMERICAN: >60
GLOBULIN: 3.7 G/DL
GLUCOSE BLD-MCNC: 98 MG/DL (ref 70–99)
HBA1C MFR BLD: 5.4 %
HBA1C MFR BLD: 5.5 %
HCT VFR BLD CALC: 37.5 % (ref 36–48)
HEMOGLOBIN: 12.4 G/DL (ref 12–16)
LV EF: 65 %
LV EF: 70 %
LVEF MODALITY: NORMAL
LVEF MODALITY: NORMAL
LYMPHOCYTES ABSOLUTE: 2.4 K/UL (ref 1–5.1)
LYMPHOCYTES RELATIVE PERCENT: 53.5 %
MCH RBC QN AUTO: 27.8 PG (ref 26–34)
MCHC RBC AUTO-ENTMCNC: 32.9 G/DL (ref 31–36)
MCV RBC AUTO: 84.5 FL (ref 80–100)
MONOCYTES ABSOLUTE: 0.4 K/UL (ref 0–1.3)
MONOCYTES RELATIVE PERCENT: 9.1 %
NEUTROPHILS ABSOLUTE: 1.5 K/UL (ref 1.7–7.7)
NEUTROPHILS RELATIVE PERCENT: 35 %
PDW BLD-RTO: 14.1 % (ref 12.4–15.4)
PLATELET # BLD: 175 K/UL (ref 135–450)
PMV BLD AUTO: 8.7 FL (ref 5–10.5)
POTASSIUM REFLEX MAGNESIUM: 4.1 MMOL/L (ref 3.5–5.1)
RBC # BLD: 4.44 M/UL (ref 4–5.2)
SEDIMENTATION RATE, ERYTHROCYTE: 11 MM/HR (ref 0–30)
SEDIMENTATION RATE, ERYTHROCYTE: 11 MM/HR (ref 0–30)
SODIUM BLD-SCNC: 140 MMOL/L (ref 136–145)
TOTAL PROTEIN: 7.1 G/DL (ref 6.4–8.2)
WBC # BLD: 4.4 K/UL (ref 4–11)

## 2021-07-16 PROCEDURE — 02H63JZ INSERTION OF PACEMAKER LEAD INTO RIGHT ATRIUM, PERCUTANEOUS APPROACH: ICD-10-PCS | Performed by: INTERNAL MEDICINE

## 2021-07-16 PROCEDURE — 80053 COMPREHEN METABOLIC PANEL: CPT

## 2021-07-16 PROCEDURE — 2709999900 HC NON-CHARGEABLE SUPPLY

## 2021-07-16 PROCEDURE — 99223 1ST HOSP IP/OBS HIGH 75: CPT | Performed by: NURSE PRACTITIONER

## 2021-07-16 PROCEDURE — 2060000000 HC ICU INTERMEDIATE R&B

## 2021-07-16 PROCEDURE — 33208 INSRT HEART PM ATRIAL & VENT: CPT | Performed by: INTERNAL MEDICINE

## 2021-07-16 PROCEDURE — C1894 INTRO/SHEATH, NON-LASER: HCPCS

## 2021-07-16 PROCEDURE — 2580000003 HC RX 258: Performed by: INTERNAL MEDICINE

## 2021-07-16 PROCEDURE — 6360000002 HC RX W HCPCS: Performed by: INTERNAL MEDICINE

## 2021-07-16 PROCEDURE — 93306 TTE W/DOPPLER COMPLETE: CPT

## 2021-07-16 PROCEDURE — C1785 PMKR, DUAL, RATE-RESP: HCPCS

## 2021-07-16 PROCEDURE — 2580000003 HC RX 258: Performed by: NURSE ANESTHETIST, CERTIFIED REGISTERED

## 2021-07-16 PROCEDURE — 36415 COLL VENOUS BLD VENIPUNCTURE: CPT

## 2021-07-16 PROCEDURE — 85025 COMPLETE CBC W/AUTO DIFF WBC: CPT

## 2021-07-16 PROCEDURE — 3430000000 HC RX DIAGNOSTIC RADIOPHARMACEUTICAL: Performed by: INTERNAL MEDICINE

## 2021-07-16 PROCEDURE — 93017 CV STRESS TEST TRACING ONLY: CPT

## 2021-07-16 PROCEDURE — 78452 HT MUSCLE IMAGE SPECT MULT: CPT

## 2021-07-16 PROCEDURE — 6360000002 HC RX W HCPCS: Performed by: NURSE ANESTHETIST, CERTIFIED REGISTERED

## 2021-07-16 PROCEDURE — 85652 RBC SED RATE AUTOMATED: CPT

## 2021-07-16 PROCEDURE — C1898 LEAD, PMKR, OTHER THAN TRANS: HCPCS

## 2021-07-16 PROCEDURE — B51N1ZZ FLUOROSCOPY OF LEFT UPPER EXTREMITY VEINS USING LOW OSMOLAR CONTRAST: ICD-10-PCS | Performed by: INTERNAL MEDICINE

## 2021-07-16 PROCEDURE — 6360000002 HC RX W HCPCS

## 2021-07-16 PROCEDURE — A9502 TC99M TETROFOSMIN: HCPCS | Performed by: INTERNAL MEDICINE

## 2021-07-16 PROCEDURE — 3700000000 HC ANESTHESIA ATTENDED CARE

## 2021-07-16 PROCEDURE — 02HK3JZ INSERTION OF PACEMAKER LEAD INTO RIGHT VENTRICLE, PERCUTANEOUS APPROACH: ICD-10-PCS | Performed by: INTERNAL MEDICINE

## 2021-07-16 PROCEDURE — 0JH606Z INSERTION OF PACEMAKER, DUAL CHAMBER INTO CHEST SUBCUTANEOUS TISSUE AND FASCIA, OPEN APPROACH: ICD-10-PCS | Performed by: INTERNAL MEDICINE

## 2021-07-16 PROCEDURE — 3700000001 HC ADD 15 MINUTES (ANESTHESIA)

## 2021-07-16 PROCEDURE — 33208 INSRT HEART PM ATRIAL & VENT: CPT

## 2021-07-16 RX ORDER — SODIUM CHLORIDE 9 MG/ML
25 INJECTION, SOLUTION INTRAVENOUS PRN
Status: DISCONTINUED | OUTPATIENT
Start: 2021-07-16 | End: 2021-07-17 | Stop reason: HOSPADM

## 2021-07-16 RX ORDER — FENTANYL CITRATE 50 UG/ML
INJECTION, SOLUTION INTRAMUSCULAR; INTRAVENOUS PRN
Status: DISCONTINUED | OUTPATIENT
Start: 2021-07-16 | End: 2021-07-16 | Stop reason: SDUPTHER

## 2021-07-16 RX ORDER — PROPOFOL 10 MG/ML
INJECTION, EMULSION INTRAVENOUS CONTINUOUS PRN
Status: DISCONTINUED | OUTPATIENT
Start: 2021-07-16 | End: 2021-07-16 | Stop reason: SDUPTHER

## 2021-07-16 RX ORDER — MIDAZOLAM HYDROCHLORIDE 1 MG/ML
INJECTION INTRAMUSCULAR; INTRAVENOUS PRN
Status: DISCONTINUED | OUTPATIENT
Start: 2021-07-16 | End: 2021-07-16 | Stop reason: SDUPTHER

## 2021-07-16 RX ORDER — SODIUM CHLORIDE 0.9 % (FLUSH) 0.9 %
5-40 SYRINGE (ML) INJECTION EVERY 12 HOURS SCHEDULED
Status: DISCONTINUED | OUTPATIENT
Start: 2021-07-16 | End: 2021-07-17 | Stop reason: HOSPADM

## 2021-07-16 RX ORDER — SODIUM CHLORIDE 0.9 % (FLUSH) 0.9 %
5-40 SYRINGE (ML) INJECTION PRN
Status: DISCONTINUED | OUTPATIENT
Start: 2021-07-16 | End: 2021-07-17 | Stop reason: HOSPADM

## 2021-07-16 RX ORDER — SODIUM CHLORIDE, SODIUM LACTATE, POTASSIUM CHLORIDE, CALCIUM CHLORIDE 600; 310; 30; 20 MG/100ML; MG/100ML; MG/100ML; MG/100ML
INJECTION, SOLUTION INTRAVENOUS CONTINUOUS PRN
Status: DISCONTINUED | OUTPATIENT
Start: 2021-07-16 | End: 2021-07-16 | Stop reason: SDUPTHER

## 2021-07-16 RX ORDER — CEFAZOLIN SODIUM 1 G/3ML
INJECTION, POWDER, FOR SOLUTION INTRAMUSCULAR; INTRAVENOUS PRN
Status: DISCONTINUED | OUTPATIENT
Start: 2021-07-16 | End: 2021-07-16 | Stop reason: SDUPTHER

## 2021-07-16 RX ADMIN — Medication 10 ML: at 08:41

## 2021-07-16 RX ADMIN — TETROFOSMIN 10 MILLICURIE: 1.38 INJECTION, POWDER, LYOPHILIZED, FOR SOLUTION INTRAVENOUS at 11:34

## 2021-07-16 RX ADMIN — CEFAZOLIN SODIUM 1000 MG: 1 POWDER, FOR SOLUTION INTRAMUSCULAR; INTRAVENOUS at 18:19

## 2021-07-16 RX ADMIN — REGADENOSON 0.4 MG: 0.08 INJECTION, SOLUTION INTRAVENOUS at 12:22

## 2021-07-16 RX ADMIN — FENTANYL CITRATE 50 MCG: 50 INJECTION, SOLUTION INTRAMUSCULAR; INTRAVENOUS at 18:40

## 2021-07-16 RX ADMIN — Medication 10 ML: at 12:22

## 2021-07-16 RX ADMIN — ENOXAPARIN SODIUM 40 MG: 40 INJECTION SUBCUTANEOUS at 08:40

## 2021-07-16 RX ADMIN — Medication 10 ML: at 11:34

## 2021-07-16 RX ADMIN — Medication 10 ML: at 21:53

## 2021-07-16 RX ADMIN — FENTANYL CITRATE 50 MCG: 50 INJECTION, SOLUTION INTRAMUSCULAR; INTRAVENOUS at 18:06

## 2021-07-16 RX ADMIN — MIDAZOLAM HYDROCHLORIDE 1 MG: 2 INJECTION, SOLUTION INTRAMUSCULAR; INTRAVENOUS at 18:21

## 2021-07-16 RX ADMIN — SODIUM CHLORIDE, SODIUM LACTATE, POTASSIUM CHLORIDE, AND CALCIUM CHLORIDE: .6; .31; .03; .02 INJECTION, SOLUTION INTRAVENOUS at 17:58

## 2021-07-16 RX ADMIN — PROPOFOL 40 MCG/KG/MIN: 10 INJECTION, EMULSION INTRAVENOUS at 18:06

## 2021-07-16 RX ADMIN — MIDAZOLAM HYDROCHLORIDE 1 MG: 2 INJECTION, SOLUTION INTRAMUSCULAR; INTRAVENOUS at 18:06

## 2021-07-16 RX ADMIN — TETROFOSMIN 30 MILLICURIE: 1.38 INJECTION, POWDER, LYOPHILIZED, FOR SOLUTION INTRAVENOUS at 12:21

## 2021-07-16 ASSESSMENT — PULMONARY FUNCTION TESTS
PIF_VALUE: 0
PIF_VALUE: 1
PIF_VALUE: 0
PIF_VALUE: 2
PIF_VALUE: 0
PIF_VALUE: 0
PIF_VALUE: 1
PIF_VALUE: 1
PIF_VALUE: 0
PIF_VALUE: 2
PIF_VALUE: 0
PIF_VALUE: 1
PIF_VALUE: 0
PIF_VALUE: 1
PIF_VALUE: 0
PIF_VALUE: 1
PIF_VALUE: 0
PIF_VALUE: 1
PIF_VALUE: 0

## 2021-07-16 ASSESSMENT — PAIN SCALES - GENERAL
PAINLEVEL_OUTOF10: 0

## 2021-07-16 ASSESSMENT — LIFESTYLE VARIABLES: SMOKING_STATUS: 0

## 2021-07-16 NOTE — PROCEDURES
[facility], [place]     Electrophysiology Procedure Note       Date of Procedure: 7/16/2021  Patient's Name: Lesli Carvajal  YOB: 1970   Medical Record Number: [id number]  Procedure Performed by: Scotty Olmstead MD    Procedures performed:  · Selective venography of left upper extremity. · Insertion of MRI compatible right ventricular pacing lead under fluoroscopy. · Insertion of MRI compatible right atrial lead under fluoroscopy  · Insertion of a MRI compatible dual chamber Pacemaker. · Electronic analysis of lead and device. · Anesthesia: Local and Monitored Anesthesia Care  · Mallampati airway assessment class: I  · ASA class: 1  · Estimated blood loss less than 20 cc    Indication of the procedure:       Lesli Carvajal is a 45 y.o. female with symptomatic bradycardia. The patient is referred for pacemaker implantation due to non-reversible bradycardia secondary to sick sinus syndrome with symptoms of presyncope. Details of procedure: The patient was brought to the electrophysiology laboratory in stable condition. The patient was in a fasting and non-sedated state. The risks, benefits and alternatives of the procedure were discussed with the patient. The risks including, but not limited to, the risks of vascular injury, bleeding, infection, device malfunction, lead dislodgement, radiation exposure, injury to cardiac and surrounding structures (including pneumothorax), stroke, myocardial infarction and death were discussed in detail. The patient opted to proceed with the device implantation. Written informed consent was signed and placed in the chart. Prophylactic antibiotic using Ancef 2 g IV was given. The patient was prepped and draped in sterile fashion. A timeout protocol was completed to identify the patient and the procedure being performed. IV sedation was provided with IV Versed and IV Fentanyl.  The patient was monitored continuously with ECG, pulse oximetry, blood pressure monitoring, and direct observation. An incision was made in the left upper pectoral area in a transverse line roughly 1 cm from the clavicle after administration of lidocaine/bupivicaine combination. Using electrocautery and blunt dissection, a pocket was created. Central venous access into the left extrathoracic subclavian vein was obtained using the modified Seldinger technique. After central venous access was obtained, a sheath was placed in the axillary vein. A right ventricular lead was advanced into the RV septal position under fluoroscopic guidance and using a series of curved and straight stylets. The lead was actively fixated. After confirming appropriate function and no diaphragmatic stimulation at maximum output, the sheath was split and removed. The lead was secured to the underlying tissue using suture material.      A new sheath was advanced over a second previously placed wire into the vein. The atrial lead was advanced to the right atrial appendage and actively fixated under fluoroscopic guidance. After confirming appropriate function and no diaphragmatic stimulation at maximum output, the sheath was split and removed. The lead was secured to the underlying tissue using suture. The leads were then connected to the new pulse generator which was then placed into the pocket. Antibiotic solution along with saline flush was used to irrigate the pocket. The pocket was then closed using a 2-0 and 3-0 Vicryl subcutaneous layer and a subcuticular layer using 4-0 Vicryl. The skin was covered with Steri-Strips and dressing. All sponge and needle counts were reported as correct at the end of the procedure. The patient tolerated the procedure well and there were no complications. Patient was transported to the holding area in stable condition. Device and Leads information:      Device was programmed to MVP with lower rate of 60 and upper tracking rate of 130. Impression:    Pre- and post-procedural diagnoses of sick sinus syndrome with symptoms of presyncope with successful implantation of a Medtronic 2-chamber PPM.     Plan:     The patient will be transferred to the floor and have usual post-implant care, including chest x-ray, intravenous antibiotic therapy, and interrogation of the device. From an EP perspective, if the interrogation and CXR tomorrow AM are showing appropriate functioning, parameters and placement, the patient may be discharged from the hospital. Follow-up will be a 1-week wound check with our nurse in the [facility] and a 1-month follow-up with Ms. Fam Goldstein. Thank you for allowing us to participate in the care of your patient. If you have any questions, please do not hesitate to contact me.     Fidelina Gomez MD   Cardiac Electrophysiology  [facility]

## 2021-07-16 NOTE — PRE SEDATION
Sedation Pre-Procedure Note    Patient Name: Krzysztof Hannah   YOB: 1970  Room/Bed: 1118/7142-07  Medical Record Number: [id number]  Date: 7/16/2021   Time: 6:00 PM       Indication: Sick sinus syndrome    Consent: I have discussed with the patient and/or the patient representative the indication, alternatives, and the possible risks and/or complications of the planned procedure and the anesthesia methods. The patient and/or patient representative appear to understand and agree to proceed. Vital Signs:   Vitals:    07/16/21 1751   BP:    Pulse: 61   Resp:    Temp:    SpO2:        Past Medical History:   has a past medical history of Anemia and Hypertension. Past Surgical History:   has a past surgical history that includes Hysterectomy. Medications:   Scheduled Meds:    sodium chloride flush  10 mL Intravenous 2 times per day    benzonatate  200 mg Oral TID    [Held by provider] lisinopril  10 mg Oral Daily    enoxaparin  40 mg Subcutaneous Daily    aspirin  81 mg Oral Daily     Continuous Infusions:    sodium chloride       PRN Meds: sodium chloride flush, sodium chloride, potassium chloride, magnesium sulfate, promethazine **OR** ondansetron, acetaminophen **OR** acetaminophen, morphine, perflutren lipid microspheres, nitroGLYCERIN, perflutren lipid microspheres  Home Meds:   Prior to Admission medications    Medication Sig Start Date End Date Taking?  Authorizing Provider   lisinopril (PRINIVIL;ZESTRIL) 10 MG tablet Take 10 mg by mouth daily   Yes Historical Provider, MD   naproxen (NAPROSYN) 500 MG tablet Take 500 mg by mouth 2 times daily (with meals)   Yes Historical Provider, MD     Coumadin Use Last 7 Days:  no  Antiplatelet drug therapy use last 7 days: no  Other anticoagulant use last 7 days: no  Additional Medication Information: None      Pre-Sedation Documentation and Exam:   I have personally completed a history, physical exam & review of systems for this patient (see notes). Vital signs have been reviewed (see flow sheet for vitals).     Mallampati Airway Assessment:  normal neck range of motion    Prior History of Anesthesia Complications:   none    ASA Classification:  Class 2 - A normal healthy patient with mild systemic disease    Sedation/ Anesthesia Plan:   intravenous sedation    Medications Planned:   propofol intravenously    Patient is an appropriate candidate for plan of sedation: yes    Electronically signed by Gee Kearns MD on 7/16/2021 at 6:00 PM

## 2021-07-16 NOTE — CONSULTS
[facility]   Electrophysiology Nurse Practitioner  Consult Rounding    Date: 7/16/2021    Reason for Consultation/ Chief Complaint: Bradycardia, pauses    History Obtained From: Patient and medical record. Cardiac Hx: Parrish Gandhi is a 45 y.o. woman w/ PMH HTN, anemia, anxiety who p/w CP, lightheadedness, SOB, noted to have multiple significant pauses on tele    HPI: Patient was admitted for c/o CP, lightheadedness and SOB for the past month. Describes the CP as sharp, non radiating, usually occurs activity, associated w/ SOB and dizziness, usually resolves with rest however on 7/14/21 it was not improving. Troponin <0.01. CXR negative for acute disease. CT Chest showed patchy ground glass opacities in BLL. Myoview pending. Patient noted to have episodes of bradycardia, as low as 36 and pauses on the monitor up to 3.2 seconds. Pt states she feels fatigued, intermittently lightheaded and dizzy. No syncopal events in the hospital however patient states that she does have a history of passing out as recently as last year. Denies palpitations. Today she is in NSR on the monitor however overnight and this morning noted to have periods of junctional rhythm w/ several pauses. BP well controlled. CP & SOB have improved, no PND, orthopnea, BL edema. Pt to go for myoview today. Home medications:   No current facility-administered medications on file prior to encounter.      Current Outpatient Medications on File Prior to Encounter   Medication Sig Dispense Refill    lisinopril (PRINIVIL;ZESTRIL) 10 MG tablet Take 10 mg by mouth daily      naproxen (NAPROSYN) 500 MG tablet Take 500 mg by mouth 2 times daily (with meals)         Scheduled Meds:   sodium chloride flush  10 mL Intravenous 2 times per day    benzonatate  200 mg Oral TID    [Held by provider] lisinopril  10 mg Oral Daily    enoxaparin  40 mg Subcutaneous Daily    aspirin  81 mg Oral Daily     Continuous Infusions:   sodium chloride       PRN Meds:sodium chloride flush, sodium chloride, potassium chloride, magnesium sulfate, promethazine **OR** ondansetron, acetaminophen **OR** acetaminophen, morphine, perflutren lipid microspheres, nitroGLYCERIN, regadenoson, perflutren lipid microspheres       Past Medical History:   Diagnosis Date    Anemia     Hypertension         Past Surgical History:   Procedure Laterality Date    HYSTERECTOMY         No Known Allergies    Social History:  Reviewed. reports that she has never smoked. She uses smokeless tobacco. She reports previous alcohol use. She reports current drug use. Drug: Marijuana. Family History:  Reviewed. family history is not on file. Review of System:    Constitutional: No fevers, chills. Eyes: No visual changes or diplopia. No scleral icterus. ENT: No Headaches. No mouth sores or sore throat. Cardiovascular: No for chest pain, No for dyspnea on exertion, No for palpitations or No for loss of consciousness. No cough, hemoptysis, No for pleuritic pain, or phlebitis. Respiratory: No for cough or wheezing. No hematemesis. Gastrointestinal: No abdominal pain, blood in stools. Musculoskeletal: No gait disturbance,    Integumentary: No rash or pruritis. Neurological: No headache, change in muscle strength, numbness or tingling. Psychiatric: No anxiety, or depression. Physical Examination:  Vitals:    21 0754   BP: 133/80   Pulse: 50   Resp: 18   Temp: 97 °F (36.1 °C)   SpO2: 99%      In: 1005 [P. O.:600; I.V.:405]  Out: 0    Wt Readings from Last 3 Encounters:   21 146 lb 13.2 oz (66.6 kg)     Temp  Av.3 °F (36.3 °C)  Min: 97 °F (36.1 °C)  Max: 98 °F (36.7 °C)  Pulse  Av.4  Min: 49  Max: 69  BP  Min: 111/72  Max: 151/89  SpO2  Av.4 %  Min: 96 %  Max: 100 %    Intake/Output Summary (Last 24 hours) at 2021 0915  Last data filed at 2021 0754  Gross per 24 hour   Intake 1005 ml   Output 0 ml   Net 1005 ml       Constitutional: Oriented. No distress. Head: Normocephalic and atraumatic. Mouth/Throat: Oropharynx is clear and moist.   Eyes: Conjunctivae clear without jaunduice. PERRL. Neck: Neck supple. No rigidity. No JVD present. Cardiovascular: aga rate, regular rhythm, S1&S2. Pulmonary/Chest: Bilateral respiratory sounds. No wheezes, No rhonchi. Abdominal: Soft. Bowel sounds present. No distension, No tenderness. Musculoskeletal: No tenderness. No edema    Lymphadenopathy: Has no cervical adenopathy. Neurological: Alert and oriented. Cranial nerve appears intact, No Gross deficit, + dizziness, lightheadedness, fatigue  Skin: Skin is warm and dry. No rash noted. Psychiatric: Has a normal mood, affect and behavior     Labs:  Reviewed. Recent Labs     07/14/21  1709 07/15/21  0513 07/16/21  0445    139 140   K 3.9 3.8 4.1    106 108   CO2 26 26 25   PHOS  --  3.3  --    BUN 10 11 12   CREATININE 0.6 0.7 0.8     Recent Labs     07/14/21  1704 07/15/21  0513 07/16/21  0445   WBC 5.0 5.0 4.4   HGB 11.9* 12.1 12.4   HCT 37.7 36.9 37.5   MCV 84.8 85.4 84.5    167 175     Lab Results   Component Value Date    TROPONINI <0.01 07/15/2021     No results found for: BNP  No results found for: PROTIME, INR  Lab Results   Component Value Date    CHOL 189 07/15/2021    HDL 54 07/15/2021    TRIG 162 07/15/2021       Telemetry: Personally reviewed: NSR, periods of junctional rhythm, HR in 30's & pauses    Radiography: Personally reviewed:   CXR 7/14/21      No consolidation     CT chest 7/14/21  1. No evidence of pulmonary embolus. 2. Patchy groundglass opacities in bilateral lower lobes which may relate to atelectasis or pneumonitis.          ECG: Personally reviewed: SR, HR 63, , , QTc 454    ECHO:  n/a    Stress Test: pending    Cardiac Angiography: n/a    Problem List:   Patient Active Problem List    Diagnosis Date Noted    Bradycardia 07/15/2021    Chest pain 07/14/2021        Assessment: CP  Bradycardia  Pauses  SSS  HTN    CP  - Intermittent  - Associated w/ SOB, activity  - Troponin <0.01  - Myoview today    SSS/ bradycardia/ pauses  - Periodic episodes of junctional rhythm with pauses, longest lasting 3.2 seconds during the day  - Pt w/ complaints of fatigue, lightheadedness, dizziness, hx syncopal episodes  - TSH wnl  - Avoid AV contreras blocking agents  - If myoview negative, will place PPM, discussed w/ Dr. Anabelle Montgomery  - The risks, benefits and alternatives of the procedure were discussed with the patient. The risks including, but not limited to, the risks of vascular injury, bleeding, infection, device malfunction, lead dislodgement, radiation exposure, injury to cardiac and surrounding structures (including pneumothorax), stroke, myocardial infarction and death were discussed in detail. The patient opted to proceed with the device implantation. - ECG ordered and results personally reviewed     HTN  - stable this morning  - lisinopril on hold    All questions and concerns were addressed to the patient/family. Alternatives to my treatment were discussed. The note was completed using EMR. Every effort was made to ensure accuracy; however, inadvertent computerized transcription errors may be present. Cynthia Burch NP  [facility]      Patient interviewed, examined and pertinent medical data and imaging studies reviewed. Refer to the NP's note for details of clinical findings, assessment and plan with which I agree. Corrections and additions as noted:    Presented with presyncopal episodes and lightheadedness  Telemetry is consistent with sinus bradycardia, with intermittent junctional rhythm  Clinically, consistent with sick sinus syndrome  Nuclear stress test  If no ischemia, recommend dual-chamber pacemaker implantation  Keep her n.p.o.     Pako Stveenson MD   Cardiac Electrophysiology  [facility][note 3]  Office [phone]

## 2021-07-16 NOTE — PROGRESS NOTES
Hospital Medicine Progress Note     PCP: No primary care provider on file. Date of Admission: 7/14/2021      Chief Complaint:    Chief Complaint   Patient presents with    Chest Pain          45 y.o. female who presented to [facility] with past medical history of anemia, hypertension, class I obesity presented to the ED for chest pain. 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 Patient reports that daily she requires to go up at least 12 flight of stairs and then turn to take another 3 steps. Patient reports that she noted that today after she sprinted up the 12 stairs the patient became dizzy short of breath and had chest pain that is left-sided nonradiating alleviated with rest nonexertional.  Patient reports that she normally is able to do that however does take breaks between the 12 steps stairs and the three-step stairs. Patient reported the chest pain has been consistently going on for a month but today was constant and which is why she came to the ED for further evaluation. Patient also reports that she been having cough with phlegm production that has been almost a week and a half. Patient denied having any active Covid exposure but also denies being vaccinated. In the ED I requested patient was transferred for chest pain. Given patient's history I was suspicious of COVID-19 thus transferred patient for further evaluation. Given CT findings showing patchy groundglass opacities bilateral lower lobes and elevated D-dimer with respiratory symptoms. It was also reported to me by nursing staff the patient was bradycardic dipping down to the 30s. Medications : Reviewed        Allergies:  Patient has no known allergies.             PHYSICAL EXAM   /69   Pulse 57   Temp 97 °F (36.1 °C) (Oral)   Resp 18   Ht 4' 9\" (1.448 m)   Wt 146 lb 13.2 oz (66.6 kg)   SpO2 99%   BMI 31.77 kg/m²     General appearance:  mild acute distress, appears older than stated age  [de-identified]:   atraumatic, sclera anicteric, Conjunctivae clear. Neck: Supple,Trachea midline, no goiter  Respiratory:minimal accessory muscle usage, Normal respiratory effort. bilaterally without wheezing, minimal rhonchi bilaterally basilar  Cardiovascular:  Regular rate and rhythm, capillary refill 2 seconds  Abdomen: Obese, soft, non-tender, non-distended with normal bowel sounds. Musculoskeletal:  No clubbing, cyanosis. trace edema LE bilaterally. Skin: turgor normal.  No new rashes or lesions. Neurologic: Alert and oriented x4, no new focal sensory/motor deficits. Labs:     Recent Labs     07/14/21  1704 07/15/21  0513 07/16/21  0445   WBC 5.0 5.0 4.4   HGB 11.9* 12.1 12.4   HCT 37.7 36.9 37.5    167 175     Recent Labs     07/14/21  1709 07/15/21  0513 07/16/21  0445    139 140   K 3.9 3.8 4.1    106 108   CO2 26 26 25   BUN 10 11 12   CREATININE 0.6 0.7 0.8   CALCIUM 8.8 8.3 8.5   PHOS  --  3.3  --      Recent Labs     07/14/21  1709 07/15/21  0513 07/16/21  0445   AST 29 25 32   ALT 18 18 21   BILITOT 0.3 <0.2 0.5   ALKPHOS 154* 163* 159*     No results for input(s): INR in the last 72 hours. Recent Labs     07/15/21  0108 07/15/21  0513 07/15/21  1234   TROPONINI <0.01 <0.01 <0.01       Urinalysis:    No results found for: Bartolo Harada, BACTERIA, RBCUA, BLOODU, [address], GLUCOSEU    Radiology:     CXR: I have reviewed the CXR with the following interpretation:   No acute process  EKG:  I have reviewed the EKG with the following interpretation:   Normal sinus rhythm with     NM Cardiac Stress Test Nuclear Imaging   Final Result      CT CHEST PULMONARY EMBOLISM W CONTRAST   Final Result      1. No evidence of pulmonary embolus. 2. Patchy groundglass opacities in bilateral lower lobes which may relate to atelectasis or pneumonitis.          XR CHEST PORTABLE   Final Result      No consolidation            ASSESSMENT AND PLAN:  45 y.o. female with history of anemia, hypertension, class I obesity presented to the ED for chest pain. Atypical chest pain:  Ruled out for AMI  Pretest CAD probability low risk, but has unexplained bradycardia with pauses  Stress test: low risk scan  Ordered echo to eval valves, rosa Aortic  Monitor      Symptomatic bradycardia with Pauses  - TSH WNL at 2.5  - Ordered echo to eval valves, rosa Aortic  - EP consulted  - Planned PPM after stress test      Bilateral groundglass opacities:  COVID-19, ruled out  Possibly sec to atelectasis  incentive spirometry      Chronic  Anemia: ruled out. Hgb WNL. Bilateral distal upper extremity numbness/tingling:  - TSH WNL at 2.5, B12: marginal at 324 and folate 6.3      Essential Hypertension: Hold meds to keep BP up rosa with bradycardia      Class I obesity: BMI 58.6 Complicating assessment and treatment. Placing patient at risk for multiple co-morbidities as well as early death and contributing to the patient's presentation. Education, and counseled on diet/exercise.     Diet: NPO except meds ordered    DVT Prophylaxis: lovenox      Dispo: Johnna Turner MD

## 2021-07-16 NOTE — PROGRESS NOTES
4.1 sec pause asymptomatic; notified NP from EP [address] she is aware .

## 2021-07-16 NOTE — ANESTHESIA PRE PROCEDURE
Department of Anesthesiology  Preprocedure Note       Name:  Tiffanie Dunne   Age:  45 y.o.  :  1970                                          MRN:  [id number]         Date:  2021      Surgeon: * Surgery not found *    Procedure:     Medications prior to admission:   Prior to Admission medications    Medication Sig Start Date End Date Taking?  Authorizing Provider   lisinopril (PRINIVIL;ZESTRIL) 10 MG tablet Take 10 mg by mouth daily   Yes Historical Provider, MD   naproxen (NAPROSYN) 500 MG tablet Take 500 mg by mouth 2 times daily (with meals)   Yes Historical Provider, MD       Current medications:    Current Facility-Administered Medications   Medication Dose Route Frequency Provider Last Rate Last Admin    sodium chloride flush 0.9 % injection 10 mL  10 mL Intravenous 2 times per day Claudette Mater A Brian, DO   10 mL at 21 0841    sodium chloride flush 0.9 % injection 10 mL  10 mL Intravenous PRN Ahmad A Brian, DO   10 mL at 21 1222    0.9 % sodium chloride infusion  25 mL Intravenous PRN Ahmad A Brian, DO        potassium chloride 10 mEq/100 mL IVPB (Peripheral Line)  10 mEq Intravenous PRN Carolina Nim Brian, DO        magnesium sulfate 2000 mg in 50 mL IVPB premix  2,000 mg Intravenous PRN Claudette Mater A Brian, DO        promethazine (PHENERGAN) tablet 12.5 mg  12.5 mg Oral Q6H PRN Ahmad A Brian, DO        Or    ondansetron (ZOFRAN) injection 4 mg  4 mg Intravenous Q6H PRN Ahmad A Brian, DO        acetaminophen (TYLENOL) tablet 650 mg  650 mg Oral Q6H PRN Ahmad A Brian, DO   650 mg at 07/15/21 0204    Or    acetaminophen (TYLENOL) suppository 650 mg  650 mg Rectal Q6H PRN Claudette Mater A Brian, DO        benzonatate (TESSALON) capsule 200 mg  200 mg Oral TID Claudette Mater A Brian, DO   200 mg at 07/15/21 2028    [Held by provider] lisinopril (PRINIVIL;ZESTRIL) tablet 10 mg  10 mg Oral Daily Ahmad A Brian, DO   10 mg at 07/15/21 0941    morphine (PF) injection 2 mg  2 mg Intravenous Q4H PRN Claudette Mater JESSICA Torres DO        perflutren lipid microspheres (DEFINITY) injection 1.65 mg  1.5 mL Intravenous ONCE PRN Magdi Owencarmen Torres DO        nitroGLYCERIN (NITROSTAT) SL tablet 0.4 mg  0.4 mg Sublingual Q5 Min PRN David Torres DO        enoxaparin (LOVENOX) injection 40 mg  40 mg Subcutaneous Daily David Torres DO   40 mg at 07/16/21 0840    aspirin chewable tablet 81 mg  81 mg Oral Daily Kaila Smart MD   81 mg at 07/15/21 1147    perflutren lipid microspheres (DEFINITY) injection 1.65 mg  1.5 mL Intravenous ONCE PRN Kaila Smart MD           Allergies:  No Known Allergies    Problem List:    Patient Active Problem List   Diagnosis Code    Chest pain R07.9    Bradycardia R00.1       Past Medical History:        Diagnosis Date    Anemia     Hypertension        Past Surgical History:        Procedure Laterality Date    HYSTERECTOMY         Social History:    Social History     Tobacco Use    Smoking status: Never Smoker    Smokeless tobacco: Current User   Substance Use Topics    Alcohol use: Not Currently                                Ready to quit: Not Answered  Counseling given: Not Answered      Vital Signs (Current):   Vitals:    07/16/21 0452 07/16/21 0600 07/16/21 0754 07/16/21 1427   BP: 125/73  133/80 125/69   Pulse: 50  50 57   Resp: 18  18    Temp: 97.3 °F (36.3 °C)  97 °F (36.1 °C)    TempSrc: Oral  Oral Oral   SpO2: 97%  99% 99%   Weight:  146 lb 13.2 oz (66.6 kg)     Height:                                                  BP Readings from Last 3 Encounters:   07/16/21 125/69       NPO Status: Time of last liquid consumption: 2300                        Time of last solid consumption: 2300                                                      BMI:   Wt Readings from Last 3 Encounters:   07/16/21 146 lb 13.2 oz (66.6 kg)     Body mass index is 31.77 kg/m².     CBC:   Lab Results   Component Value Date    WBC 4.4 07/16/2021    RBC 4.44 07/16/2021    HGB 12.4 07/16/2021    HCT 37.5 07/16/2021    MCV 84.5 07/16/2021    RDW 14.1 07/16/2021     07/16/2021       CMP:   Lab Results   Component Value Date     07/16/2021    K 4.1 07/16/2021     07/16/2021    CO2 25 07/16/2021    BUN 12 07/16/2021    CREATININE 0.8 07/16/2021    GFRAA >60 07/16/2021    AGRATIO 0.9 07/16/2021    LABGLOM >60 07/16/2021    GLUCOSE 98 07/16/2021    PROT 7.1 07/16/2021    CALCIUM 8.5 07/16/2021    BILITOT 0.5 07/16/2021    ALKPHOS 159 07/16/2021    AST 32 07/16/2021    ALT 21 07/16/2021       POC Tests: No results for input(s): POCGLU, POCNA, POCK, POCCL, POCBUN, POCHEMO, POCHCT in the last 72 hours. Coags: No results found for: PROTIME, INR, APTT    HCG (If Applicable): No results found for: PREGTESTUR, PREGSERUM, HCG, HCGQUANT     ABGs: No results found for: PHART, PO2ART, YIU7XEI, HFU0TRQ, BEART, I6QZDTCW     Type & Screen (If Applicable):  No results found for: LABABO, LABRH    Drug/Infectious Status (If Applicable):  No results found for: HIV, HEPCAB    COVID-19 Screening (If Applicable):   Lab Results   Component Value Date    COVID19 Not Detected 07/15/2021           Anesthesia Evaluation  Patient summary reviewed and Nursing notes reviewed  Airway: Mallampati: I  TM distance: >3 FB   Neck ROM: full  Mouth opening: > = 3 FB Dental: normal exam         Pulmonary: breath sounds clear to auscultation      (-) not a current smoker (never)                           Cardiovascular:  Exercise tolerance: good (>4 METS),   (+) hypertension: moderate,     (-) past MI    NYHA Classification: I    Rhythm: regular  Rate: normal           Beta Blocker:  Not on Beta Blocker      ROS comment: troponins <.01     Preserved EF      Near syncopy   Had symptomatic bradycardia  Rate 36 with occ 3 sec pauses   For pacemaker      Neuro/Psych:               GI/Hepatic/Renal:        (-) GERD       Endo/Other: Negative Endo/Other ROS                    Abdominal:             Vascular:           Other Findings: Anesthesia Plan      MAC     ASA 2 - emergent       Induction: intravenous. MIPS: Prophylactic antiemetics administered. Anesthetic plan and risks discussed with patient. Plan discussed with CRNA.     Attending anesthesiologist reviewed and agrees with Preprocedure content              Norberto Solo DO   7/16/2021

## 2021-07-16 NOTE — CARE COORDINATION
Case Management Assessment           Daily Note                 Date/ Time of Note: 7/16/2021 3:58 PM         Note completed by: Florencia Rausch RN    Patient Name: Karina Dennis  YOB: 1970    Diagnosis:Chest pain [R07.9]  Patient Admission Status: Inpatient    Date of Admission:7/14/2021  3:58 PM Length of Stay: 2 GLOS: GMLOS: 1.7    Current Plan of Care: Stress test, PPM placement  ________________________________________________________________________________________  PT AM-PAC:   / 24 per last evaluation on: not ordered    OT AM-PAC:   / 24 per last evaluation on: not ordered    DME Needs for discharge:   ________________________________________________________________________________________  Discharge Plan: Home    Tentative discharge date: TBD    Current barriers to discharge: PPM placement, medical and cardiac stability and clearance for DC    Referrals completed: Not Applicable    Resources/ information provided: Not indicated at this time  ________________________________________________________________________________________  Case Management Notes: CM continues to follow for discharge planning and needs. Patient had stress test and awaiting reading and plan is for EP to place PPM this admission. Patient plans for return home with family at [place], CM will continue to follow for new DC needs. Becky and her family were provided with choice of provider; she and her family are in agreement with the discharge plan.     Care Transition Patient: Victoria Rausch RN  [facility]  Case Management Department  Ph: [phone]  Fax: [phone]

## 2021-07-17 ENCOUNTER — APPOINTMENT (OUTPATIENT)
Dept: GENERAL RADIOLOGY | Age: 51
DRG: 243 | End: 2021-07-17
Payer: COMMERCIAL

## 2021-07-17 VITALS
SYSTOLIC BLOOD PRESSURE: 117 MMHG | OXYGEN SATURATION: 97 % | BODY MASS INDEX: 31.96 KG/M2 | HEIGHT: 57 IN | HEART RATE: 63 BPM | WEIGHT: 148.15 LBS | DIASTOLIC BLOOD PRESSURE: 70 MMHG | TEMPERATURE: 97.5 F | RESPIRATION RATE: 16 BRPM

## 2021-07-17 PROCEDURE — 71045 X-RAY EXAM CHEST 1 VIEW: CPT

## 2021-07-17 PROCEDURE — 2580000003 HC RX 258: Performed by: INTERNAL MEDICINE

## 2021-07-17 PROCEDURE — 6370000000 HC RX 637 (ALT 250 FOR IP): Performed by: INTERNAL MEDICINE

## 2021-07-17 PROCEDURE — 6360000002 HC RX W HCPCS: Performed by: INTERNAL MEDICINE

## 2021-07-17 PROCEDURE — 99232 SBSQ HOSP IP/OBS MODERATE 35: CPT | Performed by: NURSE PRACTITIONER

## 2021-07-17 RX ORDER — ASPIRIN 81 MG/1
81 TABLET, CHEWABLE ORAL DAILY
Qty: 30 TABLET | Refills: 3 | Status: SHIPPED | OUTPATIENT
Start: 2021-07-18

## 2021-07-17 RX ADMIN — ACETAMINOPHEN 650 MG: 325 TABLET, FILM COATED ORAL at 09:15

## 2021-07-17 RX ADMIN — CEFAZOLIN 2000 MG: 10 INJECTION, POWDER, FOR SOLUTION INTRAVENOUS at 12:08

## 2021-07-17 RX ADMIN — ACETAMINOPHEN 650 MG: 325 TABLET, FILM COATED ORAL at 02:52

## 2021-07-17 RX ADMIN — ASPIRIN 81 MG: 81 TABLET, CHEWABLE ORAL at 09:14

## 2021-07-17 RX ADMIN — BENZONATATE 200 MG: 100 CAPSULE ORAL at 09:14

## 2021-07-17 RX ADMIN — Medication 10 ML: at 09:00

## 2021-07-17 RX ADMIN — CEFAZOLIN 2000 MG: 10 INJECTION, POWDER, FOR SOLUTION INTRAVENOUS at 02:51

## 2021-07-17 RX ADMIN — ENOXAPARIN SODIUM 40 MG: 40 INJECTION SUBCUTANEOUS at 09:16

## 2021-07-17 ASSESSMENT — PAIN DESCRIPTION - LOCATION
LOCATION: CHEST
LOCATION: CHEST

## 2021-07-17 ASSESSMENT — PAIN DESCRIPTION - PROGRESSION: CLINICAL_PROGRESSION: GRADUALLY WORSENING

## 2021-07-17 ASSESSMENT — PAIN SCALES - GENERAL
PAINLEVEL_OUTOF10: 0
PAINLEVEL_OUTOF10: 3
PAINLEVEL_OUTOF10: 2
PAINLEVEL_OUTOF10: 0

## 2021-07-17 ASSESSMENT — PAIN - FUNCTIONAL ASSESSMENT: PAIN_FUNCTIONAL_ASSESSMENT: ACTIVITIES ARE NOT PREVENTED

## 2021-07-17 ASSESSMENT — PAIN DESCRIPTION - ORIENTATION
ORIENTATION: LEFT
ORIENTATION: LEFT

## 2021-07-17 ASSESSMENT — PAIN DESCRIPTION - ONSET: ONSET: GRADUAL

## 2021-07-17 ASSESSMENT — PAIN DESCRIPTION - FREQUENCY: FREQUENCY: INTERMITTENT

## 2021-07-17 ASSESSMENT — PAIN DESCRIPTION - DESCRIPTORS
DESCRIPTORS: DISCOMFORT
DESCRIPTORS: DISCOMFORT

## 2021-07-17 ASSESSMENT — PAIN DESCRIPTION - PAIN TYPE
TYPE: ACUTE PAIN
TYPE: SURGICAL PAIN

## 2021-07-17 NOTE — PROGRESS NOTES
Reviewed discharge instructions with pt and family member at bedside. Questions work restrictions excuse from MD, Dr. Kenia Rodriguez made aware and paper work received.  PCA escorted pt to private vehicle with all belongings and d/c paper work

## 2021-07-17 NOTE — PROGRESS NOTES
AM assessment charted. /75   Pulse 65   Temp 97.6 °F (36.4 °C) (Oral)   Resp 17   Ht 4' 9\" (1.448 m)   Wt 148 lb 2.4 oz (67.2 kg)   SpO2 96%   BMI 32.06 kg/m²   Pt endorsed minimal pain, Denies SOB. Chest site unremarkable. Will continue to monitor.

## 2021-07-17 NOTE — DISCHARGE SUMMARY
Hospital Discharge Summary    Patient's PCP: No primary care provider on file. Admit Date: 7/14/2021   Discharge Date: 7/17/2021    Admitting Physician: Dr. Ezra Gaytan MD  Discharge Physician: Dr. Ezra Gaytan MD   Consults: cardiology    HPI: 45 y.o. female who presented to [facility] with past medical history of anemia, hypertension, class I obesity presented to the ED for chest pain.       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 Patient reports that daily she requires to go up at least 12 flight of stairs and then turn to take another 3 steps. Patient reports that she noted that today after she sprinted up the 12 stairs the patient became dizzy short of breath and had chest pain that is left-sided nonradiating alleviated with rest nonexertional.  Patient reports that she normally is able to do that however does take breaks between the 12 steps stairs and the three-step stairs. Patient reported the chest pain has been consistently going on for a month but today was constant and which is why she came to the ED for further evaluation. Brief hospital course:  Given the concern of the patients presentation and the concern of the possible multi-factorial etiology contributing to patients symptomatology.  Patient was admitted and evaluated and found to have :        Discharge Diagnoses:     Atypical chest pain:  Ruled out for AMI  Pretest CAD probability low risk, but has unexplained bradycardia with pauses, hence stress test: low risk scan  Ordered echo to eval valves, rosa Aortic: essentially normal       Symptomatic bradycardia with Pauses  - TSH WNL at 2.5  - Ordered echo to eval valves, rosa Aortic: essentially normal  - EP consulted: S/P PPM         Bilateral groundglass opacities:  COVID-19, ruled out  Possibly sec to atelectasis  Incentive spirometry  Consider repeat imaging o/p      Isolated elevation of liver enzymes: Alkaline phosphatase  -Normal GGT  -Would appear possibly bone source  -Will need outpatient follow-up for this  -Follow-up with PCP          Bilateral distal upper extremity numbness/tingling  - Possibly sec to rhythm issues  - TSH WNL at 2.5, B12: marginal at 324 and folate 6.3  - O/p f/u if continues.        Essential Hypertension: Hold meds to keep BP up rosa with bradycardia        Class I obesity: BMI 09.2 Complicating assessment and treatment. Placing patient at risk for multiple co-morbidities as well as early death and contributing to the patient's presentation. Education, and counseled on diet/exercise.       Chronic  Anemia: ruled out. Hgb WNL. Physical Exam: /75   Pulse 65   Temp 97.6 °F (36.4 °C) (Oral)   Resp 17   Ht 4' 9\" (1.448 m)   Wt 148 lb 2.4 oz (67.2 kg)   SpO2 96%   BMI 32.06 kg/m²     No results for input(s): POCGLU in the last 72 hours. General appearance: alert, appears stated age and cooperative  Head: Normocephalic, without obvious abnormality, atraumatic  Lungs: clear to auscultation bilaterally  Heart: regular rate and rhythm, S1, S2 normal, no murmur, click, rub or gallop  Abdomen: soft, non-tender; bowel sounds normal; no masses,  no organomegaly  Extremities: extremities normal, atraumatic, no cyanosis or edema  Neurologic: Grossly normal    LABS:  Recent Labs     07/16/21  0445   WBC 4.4   HGB 12.4         Recent Labs     07/16/21  0445      K 4.1      CO2 25   BUN 12   CREATININE 0.8   GLUCOSE 98     No results for input(s): INR in the last 72 hours.         Discharge Medications:   Jerrica Armando   Home Medication Instructions [id number]    Printed on:07/17/21 1500   Medication Information                      aspirin 81 MG chewable tablet  Take 1 tablet by mouth daily             lisinopril (PRINIVIL;ZESTRIL) 10 MG tablet  Take 10 mg by mouth daily             naproxen (NAPROSYN) 500 MG tablet  Take 500 mg by mouth 2 times daily (with meals)                Activity: activity as tolerated  Diet: regular diet  Wound Care: keep wound clean and dry    Disposition: home  Discharged Condition: Stable  Follow Up: Primary Care Physician in one week    Total time spent on discharge, finalizing medications, referrals and arranging outpatient follow up was more than 30 minutes    Thank you Dr. Kessler primary care provider on file. for the opportunity to be involved in this patients care. If you have any questions or concerns please feel free to contact me at [phone].

## 2021-07-17 NOTE — PLAN OF CARE
Problem: Falls - Risk of:  Goal: Will remain free from falls  Description: Will remain free from falls  Outcome: Ongoing  Note: Pt with absence of falls this shift. All fall precautions in place. Pt demonstrates safety awareness and calls to RN appropriately. Will continue to monitor. Problem: Pain:  Goal: Control of acute pain  Description: Control of acute pain  Outcome: Ongoing  Note: Pt complains of L sided chest pain at pacemaker incision site rating 3/10. Ice packs placed. PRN pain medication administered per orders. Upon reassessment, pt sleeping with RR>10. Will continue to monitor. Problem: Cardiac:  Goal: Hemodynamic stability will improve  Description: Hemodynamic stability will improve  Outcome: Ongoing  Note: VSS this shift. Pt remains in NSR with intermittent pacing. Will continue to monitor.

## 2021-07-17 NOTE — ANESTHESIA POSTPROCEDURE EVALUATION
Department of Anesthesiology  Postprocedure Note    Patient: Anh Roland  MRN: [id number]  YOB: 1970  Date of evaluation: 7/16/2021  Time:  8:21 PM     Procedure Summary     Date: 07/16/21 Room / Location: [facility] Cath Lab    Anesthesia Start: [facility] Anesthesia Stop: 1934    Procedure: Randall Adelaide W ANES Diagnosis:     Scheduled Providers: Juve Sharif DO Responsible Provider: Sita Wilson DO    Anesthesia Type: MAC ASA Status: 2 - Emergent          Anesthesia Type: MAC    Deep Phase I:      Deep Phase II:      Last vitals: Reviewed and per EMR flowsheets.        Anesthesia Post Evaluation    Patient location during evaluation: PACU  Patient participation: complete - patient participated  Level of consciousness: awake and alert  Pain score: 0  Airway patency: patent  Nausea & Vomiting: no nausea and no vomiting  Complications: no  Cardiovascular status: hemodynamically stable  Respiratory status: acceptable  Hydration status: stable

## 2021-07-17 NOTE — CARE COORDINATION
Case Management Assessment            Discharge Note                    Date / Time of Note: 7/17/2021 3:03 PM                  Discharge Note Completed by: KRISTIAN Chauhan, [place]    Patient Name: Krzysztof Hannah   YOB: 1970  Diagnosis: Chest pain [R07.9]   Date / Time: 7/14/2021  3:58 PM    Current PCP: No primary care provider on file. Clinic patient: No    Hospitalization in the last 30 days: No    Advance Directives:  Code Status: Full Code  [place] DNR form completed and on chart: No    Financial:  Payor: [employer] / Plan: [employer] CHOICE [address] EMP / Product Type: *No Product type* /      Pharmacy:    [facility] [phone] Lauren Freitas [phone]  [place] [address]  Phone: [phone] Fax: [phone]      Assistance purchasing medications?: Potential Assistance Purchasing Medications: No  Assistance provided by Case Management: N/A    Does patient want to participate in local refill/ meds to beds program?: Yes    Meds To Beds General Rules:  1. Can ONLY be done Monday- Friday between 8:30am-5pm  2. Prescription(s) must be in pharmacy by 3pm to be filled same day  3. Copy of patient's insurance/ prescription drug card and patient face sheet must be sent along with the prescription(s)  4. Cost of Rx cannot be added to hospital bill. If financial assistance is needed, please contact unit  or ;  or  CANNOT provide pharmacy voucher for patients co-pays  5.  Patients can then  the prescription on their way out of the hospital at discharge, or pharmacy can deliver to the bedside if staff is available. (payment due at time of pick-up or delivery - cash, check, or card accepted)     Able to afford home medications/ co-pay costs: Yes    ADLS:  Current PT AM-PAC Score:   /24  Current OT AM-PAC Score:   /24      DISCHARGE Disposition: Home- No Services Needed    LOC at discharge: Not Applicable  DERRICK Completed: No    Notification completed in HENS/PAS?:  Not Applicable    IMM Completed:   Not Indicated    Transportation:  Transportation PLAN for discharge: family   Mode of Transport: [address] ordered at discharge: Yes  [address]: Not Applicable  Orders faxed: No    Durable Medical Equipment:  DME Provider: None  Equipment obtained during hospitalization:     Home Oxygen and Respiratory Equipment:  Oxygen needed at discharge?: No  [address]: Not Applicable  Portable tank available for discharge?:     Dialysis:  Dialysis patient: No    Dialysis Center:  Not Applicable    Additional CM Notes: Pt from home w/family, Pt to return home at DC with no needs, Pt's family will transport home at DC. The Plan for Transition of Care is related to the following treatment goals of Chest pain [R07.9]    The Patient and/or patient representative Becky and her family were provided with a choice of provider and agrees with the discharge plan Yes    Freedom of choice list was provided with basic dialogue that supports the patient's individualized plan of care/goals and shares the quality data associated with the providers.  Not Indicated    Care Transitions patient: No    KRISTIAN Gan, [facility]  Case Management Department  Ph: [phone]  Fax: [phone]

## 2021-07-17 NOTE — PROGRESS NOTES
CC:pacemaker implant  HPI:   45 y.o. with SSS and symptomatic bradycardia who is now s/p implantation of Medtronic 2 chamber PPM    S: No chest pain or sob. No left arm pain or numbness    Tele: Sinus    O:  Physical Exam:  /65   Pulse 62   Temp 98.2 °F (36.8 °C) (Oral)   Resp 18   Ht 4' 9\" (1.448 m)   Wt 148 lb 2.4 oz (67.2 kg)   SpO2 96%   BMI 32.06 kg/m²    General (appearance):  No acute distress  Eyes: anicteric   Neck: soft, No JVD  Ears/Nose/Mouth/Thorat: No cyanosis  CV: RRR   Respiratory:  Clear, normal effort  GI: soft, non-tender, non-distended  Skin: Warm, dry. No rashes. Left chest wall dressing CDI  Neuro/Psych: Alert and oriented x3. Appropriate behavior  Ext:  No c/c. no edema  Pulses:  2+ left radial.     I.O's=     Weight  Admission: Weight: 150 lb (68 kg)   Today: Weight: 148 lb 2.4 oz (67.2 kg)    CBC:   Recent Labs     07/14/21  1704 07/15/21  0521  0445   WBC 5.0 5.0 4.4   HGB 11.9* 12.1 12.4   HCT 37.7 36.9 37.5   MCV 84.8 85.4 84.5    167 175     BMP:   Recent Labs     21  1709 07/15/21  0513 21  0445    139 140   K 3.9 3.8 4.1    106 108   CO2 26 26 25   PHOS  --  3.3  --    BUN 10 11 12   CREATININE 0.6 0.7 0.8     Mag:   Lab Results   Component Value Date    MG 2.10 07/15/2021     LIVER PROFILE:   Recent Labs     21  1709 07/15/21  0513 21  0445   AST 29 25 32   ALT 18 18 21   BILITOT 0.3 <0.2 0.5   ALKPHOS 154* 163* 159*     Pro-BNP:   Lab Results   Component Value Date    PROBNP 198 2021     CARDIAC ENZYMES:   Recent Labs     07/15/21  0108 07/15/21  0513 07/15/21  1234   TROPONINI <0.01 <0.01 <0.01         Imagin2021 CXR:     There is a cardiac conduction device in place. Leads traverse the left subclavian vein and overlie the right atrium and right ventricle. The heart is mildly enlarged. There is no visible pneumothorax. No consolidation or pleural effusion.      2021 Nuc stress:     Isidro Meza is normal isotope uptake at stress and rest. There is no evidence of    myocardial ischemia or scar.    The LVEF is greater than 70% with normal LV wall motion. 7/16/2021 Echo:   Left ventricular cavity size is normal. There is mild concentric left   ventricular hypertrophy. Overall left ventricular systolic function appears hyperdynamic with an   estimated ejection fraction of >65%. No regional wall motion abnormalities are noted. Diastolic filling parameters suggests normal diastolic function. No evidence of significant valvular stenosis or regurgitation present    7/14/2021 CTA PE     1. No evidence of pulmonary embolus.      2. Patchy groundglass opacities in bilateral lower lobes which may relate to atelectasis or pneumonitis. Assessment:  45 y.o. with sss and symptomatic bradycardia and presyncope.  Now s/p PPM   Issues:  -SSS  -Pacemaker  -HTN    Plan:  CXR: Ok  Device interrogation: normal function   ASA  Resume lisinopril     D/c home  Follow up in device clinic in 1 week

## 2021-07-19 ENCOUNTER — NURSE ONLY (OUTPATIENT)
Dept: CARDIOLOGY CLINIC | Age: 51
End: 2021-07-19
Payer: COMMERCIAL

## 2021-07-19 ENCOUNTER — PROCEDURE VISIT (OUTPATIENT)
Dept: CARDIOLOGY CLINIC | Age: 51
End: 2021-07-19

## 2021-07-19 DIAGNOSIS — Z95.0 PACEMAKER: Primary | ICD-10-CM

## 2021-07-19 DIAGNOSIS — Z95.0 PACEMAKER: ICD-10-CM

## 2021-07-19 DIAGNOSIS — I49.5 SSS (SICK SINUS SYNDROME) (HCC): ICD-10-CM

## 2021-07-19 DIAGNOSIS — R00.1 BRADYCARDIA: ICD-10-CM

## 2021-07-20 PROCEDURE — 93280 PM DEVICE PROGR EVAL DUAL: CPT | Performed by: INTERNAL MEDICINE

## 2021-07-23 ENCOUNTER — NURSE ONLY (OUTPATIENT)
Dept: CARDIOLOGY CLINIC | Age: 51
End: 2021-07-23
Payer: COMMERCIAL

## 2021-07-23 ENCOUNTER — TELEPHONE (OUTPATIENT)
Dept: CARDIOLOGY CLINIC | Age: 51
End: 2021-07-23

## 2021-07-23 DIAGNOSIS — Z95.0 PACEMAKER: ICD-10-CM

## 2021-07-23 DIAGNOSIS — R00.1 BRADYCARDIA: ICD-10-CM

## 2021-07-23 DIAGNOSIS — I49.5 SSS (SICK SINUS SYNDROME) (HCC): ICD-10-CM

## 2021-07-23 NOTE — TELEPHONE ENCOUNTER
FW,  Patient had a MDT DC PPM implanted on 7/16/21. Her employer is asking when she can return to work? 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  Please advise. I am following up with MDT to see if home monitor will work when/if patient goes to [facility] in 2022.     Thanks,

## 2021-07-23 NOTE — PROGRESS NOTES
Patient comes in for programming evaluation on her newly implanted Medtronic Dual Chamber Pacemaker by Dr. Sakshi Penaloza on 7/16/2021. All sensing and pacing parameters are within normal range. Battery life 12.4 years   AP 74.5%.  <0.1%. AT/AF burden 0%  No episodes noted. No changes need to be made at this time. Please see interrogation for more detail. Dressing removed from left chest device site. Incision is well approximated, CDI. Old drainage noted on steri strips. Reviewed post op wound care and restrictions. Pt informed to call office if she develops any fever, increased swelling, redness or drainage from site. Pt voiced an understanding. Discussed home monitoring. Monitor ordered 7/22/21. Set up instructions and STAY CONNECTED information given to patient. Patient will follow up in 1 month in office w/NP and device.

## 2021-07-25 PROCEDURE — 93280 PM DEVICE PROGR EVAL DUAL: CPT | Performed by: INTERNAL MEDICINE

## 2021-07-27 NOTE — TELEPHONE ENCOUNTER
Attempted to reach patient this morning. The number listed is incorrect. Reviewed registration sheet. Will try to reach patient on home number.

## 2021-07-28 ENCOUNTER — APPOINTMENT (OUTPATIENT)
Dept: VASCULAR LAB | Age: 51
End: 2021-07-28
Payer: COMMERCIAL

## 2021-07-28 ENCOUNTER — APPOINTMENT (OUTPATIENT)
Dept: GENERAL RADIOLOGY | Age: 51
End: 2021-07-28
Payer: COMMERCIAL

## 2021-07-28 ENCOUNTER — HOSPITAL ENCOUNTER (EMERGENCY)
Age: 51
Discharge: HOME | End: 2021-07-28
Attending: EMERGENCY MEDICINE
Payer: COMMERCIAL

## 2021-07-28 VITALS
HEART RATE: 60 BPM | SYSTOLIC BLOOD PRESSURE: 153 MMHG | RESPIRATION RATE: 16 BRPM | WEIGHT: 140 LBS | BODY MASS INDEX: 30.2 KG/M2 | OXYGEN SATURATION: 100 % | HEIGHT: 57 IN | TEMPERATURE: 98.2 F | DIASTOLIC BLOOD PRESSURE: 89 MMHG

## 2021-07-28 DIAGNOSIS — G89.18 POST-OPERATIVE PAIN: Primary | ICD-10-CM

## 2021-07-28 LAB
ANION GAP SERPL CALCULATED.3IONS-SCNC: 7 MMOL/L (ref 3–16)
BASOPHILS ABSOLUTE: 0 K/UL (ref 0–0.2)
BASOPHILS RELATIVE PERCENT: 0.5 %
BUN BLDV-MCNC: 11 MG/DL (ref 7–20)
CALCIUM SERPL-MCNC: 9.1 MG/DL (ref 8.3–10.6)
CHLORIDE BLD-SCNC: 104 MMOL/L (ref 99–110)
CO2: 28 MMOL/L (ref 21–32)
CREAT SERPL-MCNC: 0.7 MG/DL (ref 0.6–1.1)
EKG ATRIAL RATE: 62 BPM
EKG DIAGNOSIS: NORMAL
EKG P AXIS: 33 DEGREES
EKG P-R INTERVAL: 206 MS
EKG Q-T INTERVAL: 434 MS
EKG QRS DURATION: 92 MS
EKG QTC CALCULATION (BAZETT): 440 MS
EKG R AXIS: 16 DEGREES
EKG T AXIS: 52 DEGREES
EKG VENTRICULAR RATE: 62 BPM
EOSINOPHILS ABSOLUTE: 0.1 K/UL (ref 0–0.6)
EOSINOPHILS RELATIVE PERCENT: 1.5 %
GFR AFRICAN AMERICAN: >60
GFR NON-AFRICAN AMERICAN: >60
GLUCOSE BLD-MCNC: 89 MG/DL (ref 70–99)
HCT VFR BLD CALC: 38 % (ref 36–48)
HEMOGLOBIN: 12.5 G/DL (ref 12–16)
LYMPHOCYTES ABSOLUTE: 1.6 K/UL (ref 1–5.1)
LYMPHOCYTES RELATIVE PERCENT: 31.4 %
MCH RBC QN AUTO: 28.3 PG (ref 26–34)
MCHC RBC AUTO-ENTMCNC: 32.8 G/DL (ref 31–36)
MCV RBC AUTO: 86.1 FL (ref 80–100)
MONOCYTES ABSOLUTE: 0.6 K/UL (ref 0–1.3)
MONOCYTES RELATIVE PERCENT: 11 %
NEUTROPHILS ABSOLUTE: 2.9 K/UL (ref 1.7–7.7)
NEUTROPHILS RELATIVE PERCENT: 55.6 %
PDW BLD-RTO: 14.5 % (ref 12.4–15.4)
PLATELET # BLD: 186 K/UL (ref 135–450)
PMV BLD AUTO: 8.1 FL (ref 5–10.5)
POTASSIUM REFLEX MAGNESIUM: 4.2 MMOL/L (ref 3.5–5.1)
RBC # BLD: 4.41 M/UL (ref 4–5.2)
SODIUM BLD-SCNC: 139 MMOL/L (ref 136–145)
WBC # BLD: 5.2 K/UL (ref 4–11)

## 2021-07-28 PROCEDURE — 80048 BASIC METABOLIC PNL TOTAL CA: CPT

## 2021-07-28 PROCEDURE — 85025 COMPLETE CBC W/AUTO DIFF WBC: CPT

## 2021-07-28 PROCEDURE — 93970 EXTREMITY STUDY: CPT

## 2021-07-28 PROCEDURE — 93005 ELECTROCARDIOGRAM TRACING: CPT | Performed by: EMERGENCY MEDICINE

## 2021-07-28 PROCEDURE — 99283 EMERGENCY DEPT VISIT LOW MDM: CPT

## 2021-07-28 PROCEDURE — 71045 X-RAY EXAM CHEST 1 VIEW: CPT

## 2021-07-28 PROCEDURE — 36415 COLL VENOUS BLD VENIPUNCTURE: CPT

## 2021-07-28 RX ORDER — ACETAMINOPHEN 325 MG/1
650 TABLET ORAL EVERY 6 HOURS PRN
COMMUNITY

## 2021-07-28 ASSESSMENT — PAIN SCALES - GENERAL: PAINLEVEL_OUTOF10: 5

## 2021-07-28 ASSESSMENT — PAIN DESCRIPTION - PAIN TYPE: TYPE: ACUTE PAIN

## 2021-07-28 ASSESSMENT — PAIN DESCRIPTION - DESCRIPTORS: DESCRIPTORS: ACHING

## 2021-07-28 ASSESSMENT — PAIN DESCRIPTION - LOCATION: LOCATION: NECK

## 2021-07-28 ASSESSMENT — PAIN DESCRIPTION - FREQUENCY: FREQUENCY: CONTINUOUS

## 2021-07-28 NOTE — ED TRIAGE NOTES
Patient had pacemaker placed 7/15, having pain in chest area, itching, swelling, and pus. States these symptoms have been going on since 1.5 weeks.  Denies shortness of breath, n/v/d.

## 2021-07-28 NOTE — ED PROVIDER NOTES
[address] [facility] 71 ENCOUNTER          ATTENDING PHYSICIAN NOTE       Date of evaluation: 7/28/2021    ADDENDUM:      Care of this patient was assumed from Dr. Bandar Allen. The patient was seen for Neck Pain and Pacemaker Problem (swelling and pain around pacemaker, had placed thursday 7/15)  . The patient's initial evaluation and plan have been discussed with the prior provider who initially evaluated the patient. Nursing Notes, Past Medical Hx, Past Surgical Hx, Social Hx, Allergies, and Family Hx were all reviewed. Diagnostic Results       RADIOLOGY:  VL Extremity Venous Bilateral         XR CHEST PORTABLE   Final Result   1. No acute cardiopulmonary abnormalities.    2. No interval changes          LABS:   Results for orders placed or performed during the hospital encounter of 07/28/21   CBC Auto Differential   Result Value Ref Range    WBC 5.2 4.0 - 11.0 K/uL    RBC 4.41 4.00 - 5.20 M/uL    Hemoglobin 12.5 12.0 - 16.0 g/dL    Hematocrit 38.0 36.0 - 48.0 %    MCV 86.1 80.0 - 100.0 fL    MCH 28.3 26.0 - 34.0 pg    MCHC 32.8 31.0 - 36.0 g/dL    RDW 14.5 12.4 - 15.4 %    Platelets 063 161 - 135 K/uL    MPV 8.1 5.0 - 10.5 fL    Neutrophils % 55.6 %    Lymphocytes % 31.4 %    Monocytes % 11.0 %    Eosinophils % 1.5 %    Basophils % 0.5 %    Neutrophils Absolute 2.9 1.7 - 7.7 K/uL    Lymphocytes Absolute 1.6 1.0 - 5.1 K/uL    Monocytes Absolute 0.6 0.0 - 1.3 K/uL    Eosinophils Absolute 0.1 0.0 - 0.6 K/uL    Basophils Absolute 0.0 0.0 - 0.2 K/uL   Basic Metabolic Panel w/ Reflex to MG   Result Value Ref Range    Sodium 139 136 - 145 mmol/L    Potassium reflex Magnesium 4.2 3.5 - 5.1 mmol/L    Chloride 104 99 - 110 mmol/L    CO2 28 21 - 32 mmol/L    Anion Gap 7 3 - 16    Glucose 89 70 - 99 mg/dL    BUN 11 7 - 20 mg/dL    CREATININE 0.7 0.6 - 1.1 mg/dL    GFR Non-African American >60 >60    GFR African American >60 >60    Calcium 9.1 8.3 - 10.6 mg/dL   EKG 12 Lead   Result Value Ref Range Ventricular Rate 62 BPM    Atrial Rate 62 BPM    P-R Interval 206 ms    QRS Duration 92 ms    Q-T Interval 434 ms    QTc Calculation (Bazett) 440 ms    P Axis 33 degrees    R Axis 16 degrees    T Axis 52 degrees    Diagnosis       EKG performed in ER and to be interpreted by ER physician. Confirmed by MD, ER ([id number]),  Jerri Bourgeois ([id number]) on 7/28/2021 12:59:30 PM       RECENT VITALS:  BP: (!) 153/89, Temp: 98.2 °F (36.8 °C), Pulse: 60, Resp: 16, SpO2: 100 %     Procedures       ED Course     The patient was given the following medications:  No orders of the defined types were placed in this encounter. CONSULTS:  Estella Jeff / Reza Ramirez / Vu Orourke     Eva George is a 45 y.o. female with a history of sick sinus syndrome, almost 2 weeks status post pacemaker placement, who presented with pain around the site of the pacemaker, radiating into the left side of the neck and left shoulder and upper arm, with reported fluctuance over the site of the pacemaker, without warmth or erythema and without active drainage. Please see Dr. Gerardo Cutler' initial dictation for details of the patient's history, physical examination, and initial emergency department course. The patient's care was given over to me with venous duplex of the left upper extremity pending, and results of a cardiology consultation pending. As noted above, the venous duplex of the bilateral upper extremities did not show evidence of DVT, and showed symmetric venous flow bilaterally. The pacer leads passing through the subclavian vein on the left were also noted. The patient's pacemaker was interrogated, and was working appropriately. On reexamination, the pacemaker site is noted to be mildly circumferentially edematous, with mild resolving ecchymosis, and mild to moderate tenderness to palpation. There is no warmth or erythema.   Steri-Strips are in place of the incision, clean, dry, and intact, without dehiscence or drainage. No purulence is noted. At this time, there does not appear to be any evidence of infection at the site. The patient's case was discussed with Yudith Chappell, the cardiology EP nurse practitioner closely involved in the patient's care. She feels reassured by the patient's work-up in the emergency department, and has asked that the patient follow-up with her as an outpatient in the office at 4:00 tomorrow afternoon. The patient states that she will be able to make this appointment time, and is comfortable with the plan for discharge at this time. Clinical Impression     1.  Post-operative pain        Disposition     PATIENT REFERRED TO:  Dylan Patton, APRN - CNP  [address]    Go in 1 day  at [place] [address], as scheduled      DISCHARGE MEDICATIONS:  New Prescriptions    No medications on file       DISPOSITION Decision To Discharge 07/28/2021 04:53:25 PM       Brandy Guidry MD  07/28/21 3001

## 2021-07-28 NOTE — ED PROVIDER NOTES
[address] [facility] 71 ENCOUNTER          ATTENDING PHYSICIAN NOTE       Date of evaluation: 7/28/2021    Chief Complaint     Neck Pain and Pacemaker Problem (swelling and pain around pacemaker, had placed thursday 7/15)      History of Present Illness     Ramsey Martinez is a 45 y.o. female who presents to the emergency room today complaining of swelling over the site of her recent pacemaker insertion and pain in her bilateral neck. Patient states that she has been doing well at home since admission to the hospital for pacemaker placement. She reports no fevers or chills or cough or shortness of breath. She states that over the last few days to a week she has noticed increased swelling and pain around the site of her pacemaker insertion. She reports no trauma to the area, states that she has been adhering to all activity restrictions post placement. Review of Systems     Review of Systems  All systems were reviewed with the patient/family and negative except as otherwise documented in the HPI. Past Medical, Surgical, Family, and Social History     She has a past medical history of Anemia and Hypertension. She has a past surgical history that includes Hysterectomy. Her family history is not on file. She reports that she has never smoked. She uses smokeless tobacco. She reports previous alcohol use. She reports current drug use. Drug: Marijuana. Medications     Previous Medications    ACETAMINOPHEN (TYLENOL) 325 MG TABLET    Take 650 mg by mouth every 6 hours as needed for Pain    ASPIRIN 81 MG CHEWABLE TABLET    Take 1 tablet by mouth daily    LISINOPRIL (PRINIVIL;ZESTRIL) 10 MG TABLET    Take 10 mg by mouth daily    NAPROXEN (NAPROSYN) 500 MG TABLET    Take 500 mg by mouth 2 times daily (with meals)       Allergies     She has No Known Allergies.     Physical Exam     INITIAL VITALS: BP: (!) 153/95, Temp: 98.2 °F (36.8 °C), Pulse: 78, Resp: 18, SpO2: 100 %   Physical Exam  Constitutional: Well-developed, well-nourished appearing female sitting up in the hospital stretcher comfortable and in no acute distress  Eyes:  Sclera anicteric. HEENT:  Normocephalic, oropharynx moist.   Neck: normal range of motion, supple, no JVD, no crepitance, no cutaneous abnormalities. Respiratory:  Even and non-labored, no distress, clear to auscultation bilaterally  Cardiovascular:  Extremities warm, well perfused, no audible murmurs rubs or gallops. In the left chest wall there is a pacemaker pocket with some overlying swelling and fluctuance, no bruising or erythema is noted. The area is not warm to touch. Incision is closed with Steri-Strips and sutures without active drainage. GI:  Soft, nondistended   Musculoskeletal:  No edema, no deformities. Skin:  No rash, no lesions, no pallor   Neurologic:  Awake and alert. Moving all extremities purposefully and with grossly normal coordination. Psychiatric:  Normal mood. Behavior appropriate. Diagnostic Results     EKG   Atrially paced rhythm, normal axis, normal intervals, no acute ischemic appearing changes. RADIOLOGY:  XR CHEST PORTABLE   Final Result   1. No acute cardiopulmonary abnormalities.    2. No interval changes      VL Extremity Venous Bilateral    (Results Pending)       LABS:   Results for orders placed or performed during the hospital encounter of 07/28/21   CBC Auto Differential   Result Value Ref Range    WBC 5.2 4.0 - 11.0 K/uL    RBC 4.41 4.00 - 5.20 M/uL    Hemoglobin 12.5 12.0 - 16.0 g/dL    Hematocrit 38.0 36.0 - 48.0 %    MCV 86.1 80.0 - 100.0 fL    MCH 28.3 26.0 - 34.0 pg    MCHC 32.8 31.0 - 36.0 g/dL    RDW 14.5 12.4 - 15.4 %    Platelets 099 893 - 697 K/uL    MPV 8.1 5.0 - 10.5 fL    Neutrophils % 55.6 %    Lymphocytes % 31.4 %    Monocytes % 11.0 %    Eosinophils % 1.5 %    Basophils % 0.5 %    Neutrophils Absolute 2.9 1.7 - 7.7 K/uL    Lymphocytes Absolute 1.6 1.0 - 5.1 K/uL    Monocytes Absolute 0.6 0.0 - 1.3 K/uL    Eosinophils Absolute 0.1 0.0 - 0.6 K/uL    Basophils Absolute 0.0 0.0 - 0.2 K/uL   Basic Metabolic Panel w/ Reflex to MG   Result Value Ref Range    Sodium 139 136 - 145 mmol/L    Potassium reflex Magnesium 4.2 3.5 - 5.1 mmol/L    Chloride 104 99 - 110 mmol/L    CO2 28 21 - 32 mmol/L    Anion Gap 7 3 - 16    Glucose 89 70 - 99 mg/dL    BUN 11 7 - 20 mg/dL    CREATININE 0.7 0.6 - 1.1 mg/dL    GFR Non-African American >60 >60    GFR African American >60 >60    Calcium 9.1 8.3 - 10.6 mg/dL   EKG 12 Lead   Result Value Ref Range    Ventricular Rate 62 BPM    Atrial Rate 62 BPM    P-R Interval 206 ms    QRS Duration 92 ms    Q-T Interval 434 ms    QTc Calculation (Bazett) 440 ms    P Axis 33 degrees    R Axis 16 degrees    T Axis 52 degrees    Diagnosis       EKG performed in ER and to be interpreted by ER physician. Confirmed by MD, ER ([id number]),  Eber Roland ([id number]) on 7/28/2021 12:59:30 PM         RECENT VITALS:  BP: (!) 156/83,Temp: 98.2 °F (36.8 °C), Pulse: 60, Resp: 16, SpO2: 99 %     Procedures         ED Course     Nursing Notes, Past Medical Hx, Past Surgical Hx, Social Hx,Allergies, and Family Hx were reviewed. patient was given the following medications:  No orders of the defined types were placed in this encounter. CONSULTS:  Stephani Barnes DECISIONMAKING / Jeannie Peacock / Alyssa Edgar Barrett is a 45 y.o. female This is 45 who presents to the emergency department for some chest wall at the site of her recent pacemaker implantation. On arrival she is well hearing mildly hypertensive with otherwise normal vital signs. An EKG was obtained and demonstrates a paced rhythm without signs of ischemia. Chest x-ray demonstrates adequate pacemaker placement and alignment which appears unchanged from post procedure x-ray. Screening labs are unremarkable. Given the radiation of the patient's discomfort in her neck and arm a DVT ultrasound was ordered.   Cardiology was consulted to come and evaluate the patient and at this time she was signed out to the oncoming provider awaiting their recommendations and results of DVT ultrasound. .. Clinical Impression     1. Post-operative pain        Disposition     PATIENT REFERRED TO:  No follow-up provider specified.     DISCHARGE MEDICATIONS:  New Prescriptions    No medications on file       Dahlia Woodson MD  07/29/21 0143

## 2021-07-29 ENCOUNTER — NURSE ONLY (OUTPATIENT)
Dept: CARDIOLOGY CLINIC | Age: 51
End: 2021-07-29
Payer: COMMERCIAL

## 2021-07-29 ENCOUNTER — OFFICE VISIT (OUTPATIENT)
Dept: CARDIOLOGY CLINIC | Age: 51
End: 2021-07-29
Payer: COMMERCIAL

## 2021-07-29 VITALS
WEIGHT: 149.4 LBS | SYSTOLIC BLOOD PRESSURE: 110 MMHG | HEART RATE: 66 BPM | DIASTOLIC BLOOD PRESSURE: 60 MMHG | BODY MASS INDEX: 32.33 KG/M2

## 2021-07-29 DIAGNOSIS — I49.5 SSS (SICK SINUS SYNDROME) (HCC): ICD-10-CM

## 2021-07-29 DIAGNOSIS — Z95.0 PACEMAKER: ICD-10-CM

## 2021-07-29 DIAGNOSIS — I10 ESSENTIAL HYPERTENSION: ICD-10-CM

## 2021-07-29 DIAGNOSIS — R07.9 ACUTE CHEST PAIN: ICD-10-CM

## 2021-07-29 DIAGNOSIS — R00.1 BRADYCARDIA: Primary | ICD-10-CM

## 2021-07-29 DIAGNOSIS — R00.1 BRADYCARDIA: ICD-10-CM

## 2021-07-29 PROCEDURE — 93280 PM DEVICE PROGR EVAL DUAL: CPT | Performed by: INTERNAL MEDICINE

## 2021-07-29 PROCEDURE — 99214 OFFICE O/P EST MOD 30 MIN: CPT | Performed by: NURSE PRACTITIONER

## 2021-07-29 RX ORDER — LISINOPRIL 10 MG/1
10 TABLET ORAL DAILY
Qty: 90 TABLET | Refills: 2 | Status: SHIPPED | OUTPATIENT
Start: 2021-07-29

## 2021-07-29 RX ORDER — CEPHALEXIN 500 MG/1
500 CAPSULE ORAL 2 TIMES DAILY
Qty: 20 CAPSULE | Refills: 0 | Status: SHIPPED | OUTPATIENT
Start: 2021-07-29 | End: 2021-08-08

## 2021-07-29 NOTE — TELEPHONE ENCOUNTER
Spoke to patient in person during ov w/ NPFW on 7/29/21. Per MDT rep- home monitor will work in [employer]. Ash Atkins will address return to work letter.

## 2021-07-29 NOTE — PROGRESS NOTES
[address]   Electrophysiology  Office Visit  Date: 7/29/2021    Chief Complaint   Patient presents with    Bradycardia    Chest Pain    Shortness of Breath    Hypertension       Cardiac HX: Molly Evans is a 45 y.o. woman with a h/o HTN, anemia, anxiety who recently presented to [facility] (7/16/2021) with c/o CP, lightheadedness, SOB, noted to have multiple significant pauses on tele, SB with rates in the 30's, s/p dual ch MDT PPM (7/16/2021, Dr. Elida Corea) then was seen in the ED 7/28/21 with c/o pain at L chest PPM site. Interval History/HPI: Patient is here for f/u for symptomatic sinus bradycardia, SSS and PPM management. Patient had originally presented to [facility] on 7/16/2021 with complaints of chest pain, lightheadedness and shortness of breath. She was noticed to be intermittent sinus bradycardia with significant pauses on telemetry. She would have rates in the 30s. She did undergo a dual-chamber permanent pacemaker on 7/16/2021. She then presented to the emergency room yesterday with complaints of pain at the left chest pacemaker site. She had a chest x-ray which showed stable lead placement. She also is complaining of some left shoulder and arm pain and underwent an ultrasound which showed no evidence of clot. White blood cell count was stable. Today in the office her left chest incision has a small amount of edema present Steri-Strips are intact. There is no redness, warmth, drainage from the site. He has gone back to work and she works at [employer]. She is not trying to raise her left arm above her shoulder as instructed however this is difficult with the kind of job that she does. She also is complaining of some fatigue in her neck and her back after the procedure. She states that this is making it difficult for her to do her job and she would like to be on limited duty for the next month and not return to work until Monday.   Denies any dizziness, lightheadedness or syncopal events. She denies chest pain, shortness of breath, PND, orthopnea or lower extremity edema. Device check yesterday in the hospital showed 81.2% AP, 0% , no arrhythmias. Parameters are stable. Her blood pressure was elevated in the emergency room and she was placed on lisinopril 10 mg daily. Today her blood pressure is well controlled in the office at 110/60. Home medications:   Current Outpatient Medications on File Prior to Visit   Medication Sig Dispense Refill    acetaminophen (TYLENOL) 325 MG tablet Take 650 mg by mouth every 6 hours as needed for Pain      aspirin 81 MG chewable tablet Take 1 tablet by mouth daily 30 tablet 3    naproxen (NAPROSYN) 500 MG tablet Take 500 mg by mouth 2 times daily (with meals)       No current facility-administered medications on file prior to visit. Past Medical History:   Diagnosis Date    Anemia     Hypertension         Past Surgical History:   Procedure Laterality Date    HYSTERECTOMY         No Known Allergies    Social History:  Reviewed. reports that she has never smoked. She uses smokeless tobacco. She reports previous alcohol use. She reports current drug use. Drug: Marijuana. Family History:  Reviewed. family history is not on file. Review of System:    · Constitutional: No fevers, chills. · Eyes: No visual changes or diplopia. No scleral icterus. · ENT: No Headaches. No mouth sores or sore throat. · Cardiovascular: No for chest pain, No for dyspnea on exertion, No for palpitations or No for loss of consciousness. No cough, hemoptysis, No for pleuritic pain, or phlebitis. · Respiratory: No for cough or wheezing. No hematemesis. · Gastrointestinal: No abdominal pain, blood in stools. · Genitourinary: No dysuria, or hematuria. · Musculoskeletal: No gait disturbance,    · Integumentary: No rash or pruritis. · Neurological: No headache, change in muscle strength, numbness or tingling.    · Psychiatric: No anxiety, or depression. · Endocrine: No temperature intolerance. No excessive thirst, fluid intake, or urination. · Hem/Lymph: No abnormal bruising or bleeding, blood clots or swollen lymph nodes. · Allergic/Immunologic: No nasal congestion or hives. Physical Examination:  Vitals:    07/29/21 1550   BP: 110/60   Pulse: 66         Wt Readings from Last 3 Encounters:   07/29/21 149 lb 6.4 oz (67.8 kg)   07/28/21 140 lb (63.5 kg)   07/17/21 148 lb 2.4 oz (67.2 kg)       · Constitutional: Oriented. No distress. · Head: Normocephalic and atraumatic. · Mouth/Throat: Oropharynx is clear and moist.   · Eyes: Conjunctivae clear without jaunduice. PERRL. · Neck: Neck supple. No rigidity. No JVD present. · Cardiovascular: Normal rate, regular rhythm, S1&S2. · Pulmonary/Chest: Bilateral respiratory sounds. No wheezes, No rhonchi. · Abdominal: Soft. Bowel sounds present. No distension, No tenderness. · Musculoskeletal: No tenderness. No edema    · Lymphadenopathy: Has no cervical adenopathy. · Neurological: Alert and oriented. Cranial nerve appears intact, No Gross deficit   · Skin: Skin is warm and dry. No rash noted. · Psychiatric: Has a normal mood, affect and behavior     Labs:  Reviewed. Recent Labs     07/28/21  1336      K 4.2      CO2 28   BUN 11   CREATININE 0.7     Recent Labs     07/28/21  1336   WBC 5.2   HGB 12.5   HCT 38.0   MCV 86.1        Lab Results   Component Value Date    TROPONINI <0.01 07/15/2021     No results found for: BNP  No results found for: PROTIME, INR  Lab Results   Component Value Date    CHOL 189 07/15/2021    HDL 54 07/15/2021    TRIG 162 07/15/2021       ECG: Personally reviewed: A paced, V sensed, HR 66, , QRS 88, QTc 418    ECHO:  7.16.2021  Summary   Left ventricular cavity size is normal. There is mild concentric left   ventricular hypertrophy.    Overall left ventricular systolic function appears hyperdynamic with an   estimated ejection fraction of >65%. No regional wall motion abnormalities are noted. Diastolic filling parameters suggests normal diastolic function. No evidence of significant valvular stenosis or regurgitation present. Stress Test: 7.16.2021  Summary   Noemi Lemus is normal isotope uptake at stress and rest. There is no evidence of    myocardial ischemia or scar.    The LVEF is greater than 70% with normal LV wall motion. Cardiac Angiography: N/A    Problem List:   Patient Active Problem List    Diagnosis Date Noted    SSS (sick sinus syndrome) ([address]     Pacemaker     Essential hypertension     Bradycardia 07/15/2021    Acute chest pain 07/14/2021        Assessment:   1. Bradycardia    2. Acute chest pain    3. SSS (sick sinus syndrome) ([address]    4. Pacemaker    5. Essential hypertension        Cardiac HX: Job Eubanks is a 45 y.o. woman with a h/o HTN, anemia, anxiety who recently presented to [facility] (7/16/2021) with c/o CP, lightheadedness, SOB, noted to have multiple significant pauses on tele, SB with rates in the 30's, s/p dual ch MDT PPM (7/16/2021).      SSS/symptomatic SB  - S/p dual ch MDT PPM   - Device check today shows 91.2% AP, 0% , no arrhythmias, other parameters stable  - Left chest incision with a small amount of edema, Steri-Strips intact, no erythema  - Will give Keflex 500 mg twice daily x10 days prophylactically  - Patient to use ice to the incision  - Reviewed activity restrictions with patient  - Will have patient return to work on Monday, working 4 hours max daily with 10 pound lifting restrictions and no raising her arm above her shoulder  -R eviewed diagnostic testing and labs from the ED  - F/u in the office in 4 weeks  - Patient to call if any changes to her incision occluding redness, warmth or drainage  - ECG ordered and results personally reviewed     Ess HTN  - BP well controlled in the office today  - On lisionpril 10 mg QD    EF of >25%  No systolic HF  No known CAD  No known AF   No Tobacco use. All questions and concerns were addressed to the patient/family. Alternatives to my treatment were discussed. The note was completed using EMR. Every effort was made to ensure accuracy; however, inadvertent computerized transcription errors may be present. Patient received education regarding their diagnosis, treatment and medications while in the office today. Carlos Vidales [facility]      I  have spent 30 minutes in care of the patient including direct face to face time, chart preparation, reviewing diagnostic testing, other provider notes and coordinating patient care.

## 2021-07-29 NOTE — LETTER
[address] Cardiology [address]  Phone: [phone]  Fax: [phone]    JULIANNE Pak CNP        July 29, 2021     Patient: Camelia Clemons   YOB: 1970   Date of Visit: 7/29/2021       To Whom It May Concern: It is my medical opinion that Charmaine Mccrary may return to work on 8/2/2021 with the following restrictions: may work at a max of 4 hours per day until she is reevaluated at her next appointment on August 26th, 2021 @ 2:15pm .    If you have any questions or concerns, please don't hesitate to call.     Sincerely,        JULIANNE Pak CNP

## 2021-08-26 ENCOUNTER — OFFICE VISIT (OUTPATIENT)
Dept: CARDIOLOGY CLINIC | Age: 51
End: 2021-08-26
Payer: COMMERCIAL

## 2021-08-26 ENCOUNTER — NURSE ONLY (OUTPATIENT)
Dept: CARDIOLOGY CLINIC | Age: 51
End: 2021-08-26
Payer: COMMERCIAL

## 2021-08-26 VITALS
SYSTOLIC BLOOD PRESSURE: 103 MMHG | WEIGHT: 137 LBS | HEART RATE: 66 BPM | BODY MASS INDEX: 29.65 KG/M2 | DIASTOLIC BLOOD PRESSURE: 71 MMHG

## 2021-08-26 DIAGNOSIS — R00.1 BRADYCARDIA: ICD-10-CM

## 2021-08-26 DIAGNOSIS — I49.5 SSS (SICK SINUS SYNDROME) (HCC): ICD-10-CM

## 2021-08-26 DIAGNOSIS — Z95.0 PACEMAKER: ICD-10-CM

## 2021-08-26 DIAGNOSIS — R06.02 SOB (SHORTNESS OF BREATH): ICD-10-CM

## 2021-08-26 DIAGNOSIS — I10 BENIGN ESSENTIAL HTN: ICD-10-CM

## 2021-08-26 DIAGNOSIS — R00.1 BRADYCARDIA: Primary | ICD-10-CM

## 2021-08-26 PROCEDURE — 99214 OFFICE O/P EST MOD 30 MIN: CPT | Performed by: NURSE PRACTITIONER

## 2021-08-26 NOTE — LETTER
8/26/21    [facility]  Marianna Elizondo, [address] [facility], [address] [place] [address]  Phone: [phone]  Fax: [phone]    To whom it may concern:    Riley Will, 1970, is a patient under our care for a cardiac condition. She underwent a pacemaker placement on 7/16/2021. Post operatively she was not able to do any heavy lifting with her left arm, raise her left arm above her shoulder, perform any repetitive movement with her left arm or abduct/adduct her left arm until she was seen in the office on 8/26/2021. Riley Will may return to work on 8/27/2021 with no restrictions.      Sincerely,           Olayinka Garcia CNP

## 2021-08-26 NOTE — PROGRESS NOTES
Steri strips removed today by NPWS, site appears to have healed well although patient complains of pain beneath left arm. All sensing and pacing parameters are within normal range. Battery life 11y11m to AAMIR  AP 83.77%.  0.05%. AT/AF burden 0%  No episodes noted. No changes need to be made at this time. Please see interrogation for more detail. Home monitoring hardware is transmitting. Patient will follow up in 3 month remote or in office.

## 2021-09-03 PROCEDURE — 93280 PM DEVICE PROGR EVAL DUAL: CPT | Performed by: INTERNAL MEDICINE

## 2021-09-14 ENCOUNTER — NURSE ONLY (OUTPATIENT)
Dept: CARDIOLOGY CLINIC | Age: 51
End: 2021-09-14
Payer: COMMERCIAL

## 2021-09-14 DIAGNOSIS — I49.5 SSS (SICK SINUS SYNDROME) (HCC): ICD-10-CM

## 2021-09-14 DIAGNOSIS — Z95.0 PACEMAKER: ICD-10-CM

## 2021-09-16 PROCEDURE — 93294 REM INTERROG EVL PM/LDLS PM: CPT | Performed by: INTERNAL MEDICINE

## 2021-09-16 PROCEDURE — 93296 REM INTERROG EVL PM/IDS: CPT | Performed by: INTERNAL MEDICINE

## 2021-09-16 NOTE — PROGRESS NOTES
We received remote transmission from patient's dual chamber pacemaker monitor at home. Transmission shows normal sensing and pacing function. ST/SVT noted. EP physician will review. See interrogation under cardiology tab in the [address] [place] [address] [address] field for more details.

## 2021-09-23 RX ORDER — NAPROXEN 500 MG/1
500 TABLET ORAL 2 TIMES DAILY WITH MEALS
Qty: 60 TABLET | Refills: 1 | Status: SHIPPED | OUTPATIENT
Start: 2021-09-23

## 2021-09-23 NOTE — TELEPHONE ENCOUNTER
Requested Prescriptions     Pending Prescriptions Disp Refills    naproxen (NAPROSYN) 500 MG tablet 60 tablet 2     Sig: Take 1 tablet by mouth 2 times daily (with meals)     Last visit : 7/29/21    Next Visit : 10/18/21

## 2021-10-09 ENCOUNTER — HOSPITAL ENCOUNTER (EMERGENCY)
Age: 51
Discharge: HOME | End: 2021-10-09
Attending: STUDENT IN AN ORGANIZED HEALTH CARE EDUCATION/TRAINING PROGRAM
Payer: COMMERCIAL

## 2021-10-09 ENCOUNTER — APPOINTMENT (OUTPATIENT)
Dept: GENERAL RADIOLOGY | Age: 51
End: 2021-10-09
Payer: COMMERCIAL

## 2021-10-09 VITALS
SYSTOLIC BLOOD PRESSURE: 136 MMHG | WEIGHT: 158 LBS | HEIGHT: 57 IN | BODY MASS INDEX: 34.09 KG/M2 | RESPIRATION RATE: 18 BRPM | HEART RATE: 85 BPM | OXYGEN SATURATION: 100 % | TEMPERATURE: 98.4 F | DIASTOLIC BLOOD PRESSURE: 86 MMHG

## 2021-10-09 DIAGNOSIS — G89.29 CHRONIC BILATERAL LOW BACK PAIN WITHOUT SCIATICA: Primary | ICD-10-CM

## 2021-10-09 DIAGNOSIS — M54.50 CHRONIC BILATERAL LOW BACK PAIN WITHOUT SCIATICA: Primary | ICD-10-CM

## 2021-10-09 DIAGNOSIS — M79.601 RIGHT ARM PAIN: ICD-10-CM

## 2021-10-09 PROCEDURE — 99282 EMERGENCY DEPT VISIT SF MDM: CPT

## 2021-10-09 PROCEDURE — 73030 X-RAY EXAM OF SHOULDER: CPT

## 2021-10-09 PROCEDURE — 72100 X-RAY EXAM L-S SPINE 2/3 VWS: CPT

## 2021-10-09 RX ORDER — LIDOCAINE 50 MG/G
1 PATCH TOPICAL DAILY
Qty: 10 PATCH | Refills: 2 | Status: SHIPPED | OUTPATIENT
Start: 2021-10-09 | End: 2021-11-08

## 2021-10-09 ASSESSMENT — ENCOUNTER SYMPTOMS
ABDOMINAL PAIN: 0
DIARRHEA: 0
BACK PAIN: 1
CONSTIPATION: 0
COUGH: 0
VOMITING: 0
SHORTNESS OF BREATH: 0
NAUSEA: 0

## 2021-10-09 NOTE — ED PROVIDER NOTES
ED Attending Attestation Note     Date of evaluation: 10/9/2021    This patient was seen by the resident. I have seen and examined the patient, agree with the workup, evaluation, management and diagnosis. The care plan has been discussed. My assessment reveals a 2year-old female presenting with chief complaint of shoulder pain. Patient states symptoms have been going on for some time, attributes it to chronic use at work. Also complains of some lower back pain. She denies any weakness or numbness. Denies any saddle paresthesias. Denies any recent trauma. Pain films to rule out any gross abnormality, will treat pain, arrange primary care follow-up.      Pierre Alonso MD  10/09/21 [id number]

## 2021-10-09 NOTE — Clinical Note
Alvarado Soni was seen and treated in our emergency department on 10/9/2021. She may return to work on 10/11/2021. If you have any questions or concerns, please don't hesitate to call.       Cindy Mcleod MD

## 2021-10-09 NOTE — ED PROVIDER NOTES
Date of evaluation: 10/9/2021    Chief Complaint   Back Pain and Arm Pain    Nursing Notes, Past Medical Hx, Past Surgical Hx, Social Hx,Allergies, and Family Hx were reviewed. History of Present Illness     Tamia Jane is a 45 y.o. female who presents with multiple complaints, the primary of which being with the patient describes as \"chronic pain\". Patient states that she has pain in her right shoulder, arm, and bilateral lower back. Patient states his pain has been ongoing for many years, however she had been ignoring it to get on with her work. Patient states that she came in today to be evaluated for it. Her back pain does not have any red flag symptoms, such as recent B symptoms, weight loss, history of IV drug use, or traumatic injury. Patient states the pain is mostly located in her bilateral paralumbar region and is exacerbated with heavy lifting. Patient states that her arm pain mostly occurs at work, and that she feels like her work is mostly demanding up on the right side of her body physically. It is not associated with nausea/vomiting, shortness of breath, or diaphoresis. Patient also requests a work note for tomorrow. Review of Systems   Review of Systems   Constitutional: Positive for chills and fatigue (Chronic). Negative for fever. Respiratory: Negative for cough and shortness of breath. Cardiovascular: Positive for leg swelling (During work). Negative for chest pain and palpitations. Gastrointestinal: Negative for abdominal pain, constipation, diarrhea, nausea and vomiting. Endocrine: Negative for polyuria. Genitourinary: Negative for decreased urine volume, difficulty urinating, dysuria, hematuria and urgency. Musculoskeletal: Positive for back pain (Chronic). Neurological: Negative for light-headedness, numbness and headaches.      Past Medical, Surgical, Family, and Social History         Diagnosis Date    Anemia     Hypertension          Procedure Laterality Date    HYSTERECTOMY       Her family history is not on file. She reports that she has never smoked. She uses smokeless tobacco. She reports previous alcohol use. She reports current drug use. Drug: Marijuana. Medications     Previous Medications    ACETAMINOPHEN (TYLENOL) 325 MG TABLET    Take 650 mg by mouth every 6 hours as needed for Pain    ASPIRIN 81 MG CHEWABLE TABLET    Take 1 tablet by mouth daily    LISINOPRIL (PRINIVIL;ZESTRIL) 10 MG TABLET    Take 1 tablet by mouth daily    NAPROXEN (NAPROSYN) 500 MG TABLET    Take 1 tablet by mouth 2 times daily (with meals)     Allergies     She has No Known Allergies. Physical Exam     INITIAL VITALS: /86   Pulse 85   Temp 98.4 °F (36.9 °C) (Oral)   Resp 18   Ht 4' 9\" (1.448 m)   Wt 158 lb (71.7 kg)   SpO2 100%   BMI 34.19 kg/m²      Physical Exam  Constitutional:       General: She is not in acute distress. Appearance: Normal appearance. She is obese. She is not ill-appearing. HENT:      Head: Normocephalic and atraumatic. Right Ear: External ear normal.      Left Ear: External ear normal.      Nose:      Comments: Patient wearing surgical mask. Mouth/Throat:      Comments: Patient wearing surgical mask. Eyes:      General: No scleral icterus. Extraocular Movements: Extraocular movements intact. Pupils: Pupils are equal, round, and reactive to light. Cardiovascular:      Rate and Rhythm: Normal rate and regular rhythm. Heart sounds: Normal heart sounds. No murmur heard. No friction rub. No gallop. Pulmonary:      Effort: Pulmonary effort is normal. No respiratory distress. Breath sounds: No wheezing, rhonchi or rales. Abdominal:      General: Abdomen is flat. There is no distension. Palpations: Abdomen is soft. Tenderness: There is no abdominal tenderness. There is no guarding. Musculoskeletal:      Cervical back: Normal range of motion and neck supple.       Right lower leg: No edema. Left lower leg: No edema. Comments: Patient endorses some pain with extension and flexion of her right shoulder. No palpable bony abnormality present. No palpable pain in the patient's back. Skin:     General: Skin is warm and dry. Capillary Refill: Capillary refill takes less than 2 seconds. Neurological:      General: No focal deficit present. Mental Status: She is alert and oriented to person, place, and time. Psychiatric:         Mood and Affect: Mood normal.         Behavior: Behavior normal.         Diagnostic Results     EKG   N/A    RADIOLOGY:  XR SHOULDER RIGHT (MIN 2 VIEWS)   Final Result      1. No findings for acute bony or soft tissue abnormality. 2.  Mild arthritic changes in the shoulder as described. Lumbar spine series      HISTORY: Lumbar back pain      Comparison study none      FINDINGS: Lumbar vertebral body height and alignment are intact. Disc spaces are maintained. Mild endplate spurring at the L3-L4 and L4-5 disc levels. Posterior limits and facets are maintained. No spondylolysis or spondylolisthesis. Visualized bony    sacrum including the SI joints are intact. Paravertebral soft tissues appear within normal limits. IMPRESSION:      1. No findings for acute lumbar sacral spine abnormality. If patient's symptoms persist or if there remains concern for degenerative disc disease, additional imaging may be warranted. XR LUMBAR SPINE (2-3 VIEWS)   Final Result      1. No findings for acute bony or soft tissue abnormality. 2.  Mild arthritic changes in the shoulder as described. Lumbar spine series      HISTORY: Lumbar back pain      Comparison study none      FINDINGS: Lumbar vertebral body height and alignment are intact. Disc spaces are maintained. Mild endplate spurring at the L3-L4 and L4-5 disc levels. Posterior limits and facets are maintained. No spondylolysis or spondylolisthesis.   Visualized bony sacrum including the SI joints are intact. Paravertebral soft tissues appear within normal limits. IMPRESSION:      1. No findings for acute lumbar sacral spine abnormality. If patient's symptoms persist or if there remains concern for degenerative disc disease, additional imaging may be warranted. LABS:   Labs Reviewed - No data to display    RECENT VITALS:  BP: 136/86, Temp: 98.4 °F (36.9 °C), Pulse: 85,Resp: 18     Procedures     N/A    ED Course     The patient was given the following medications:  Orders Placed This Encounter   Medications    lidocaine (LIDODERM) 5 %     Sig: Place 1 patch onto the skin daily 12 hours on, 12 hours off. Dispense:  10 patch     Refill:  2          CONSULTS:  None    MEDICAL DECISION MAKING     Sima Wilder is a 45 y.o. female with multiple complaints, the primary of which is her chronic pain. Patient states that this has been an ongoing problem for her for many years, however she would like to be evaluated today and is also curious if she can have a work note with exception. Patient states that her job is physically demanding especially on the right side of her body and her back. The several locations of her pain. Her lower back pain does not appear to have a neurologic component, however does appear to be musculoskeletal issue. Additionally, her right arm pain also appears to be musculoskeletal in origin, as the range of motion in her shoulder is painful with full extension and flexion. Her arm palpably, is otherwise not painful and there is no noticeable bony abnormality. Likewise, the patient's spine is not tender nor is there a palpable bony abnormality. Given the patient's pain in the locations plain films were ordered to rule out other underlying structural abnormalities. Plain films demonstrate mild arthritic changes, however no acute bony abnormality.  Additionally, the patient will be given prescription for lidocaine patches for her back along with instructions in the discharge summary for exercises that can assist with her back pain. The patient was counseled on the use of over-the-counter anti-inflammatory medications for treatment of her pain. Patient states she does not currently have a primary care provider and has recently run out of her hypertension medication. Patient's blood pressure is not grossly elevated here in the emergency department, so the patient was given a referral to a primary care resident clinic for which she can follow-up for treatment for chronic issues. Patient verbalized understanding of this, and was interested in establishing primary care. A work note was written to exempt patient from work given her ongoing musculoskeletal issues. Patient given strict return precautions that if her pain were to worsen or acutely changes that she should represent to the emergency department to be reevaluated. Patient amenable to discharge at this time. This patient was also evaluated by the attending physician. All care plans were discussed and agreed upon. Clinical Impression     1. Chronic bilateral low back pain without sciatica    2. Right arm pain        Disposition/Plan     PATIENT REFERRED TO:  [facility] AT [address]    Schedule an appointment as soon as possible for a visit in 1 week  To establish care with a primary care provider    [facility] Emergency Department  [address] [place] [address]  [phone]    If symptoms worsen      DISCHARGE MEDICATIONS:  New Prescriptions    LIDOCAINE (LIDODERM) 5 %    Place 1 patch onto the skin daily 12 hours on, 12 hours off.        DISPOSITION discharged home        Richa Sanders MD  Resident  10/09/21 0399

## 2021-12-14 ENCOUNTER — NURSE ONLY (OUTPATIENT)
Dept: CARDIOLOGY CLINIC | Age: 51
End: 2021-12-14

## 2021-12-14 DIAGNOSIS — Z95.0 PACEMAKER: ICD-10-CM

## 2021-12-14 DIAGNOSIS — I49.5 SSS (SICK SINUS SYNDROME) (HCC): ICD-10-CM

## 2021-12-14 PROCEDURE — 93296 REM INTERROG EVL PM/IDS: CPT | Performed by: INTERNAL MEDICINE

## 2021-12-14 PROCEDURE — 93294 REM INTERROG EVL PM/LDLS PM: CPT | Performed by: INTERNAL MEDICINE

## 2022-03-15 ENCOUNTER — NURSE ONLY (OUTPATIENT)
Dept: CARDIOLOGY CLINIC | Age: 52
End: 2022-03-15

## 2022-03-15 DIAGNOSIS — I49.5 SSS (SICK SINUS SYNDROME) (HCC): ICD-10-CM

## 2022-03-15 DIAGNOSIS — Z95.0 PACEMAKER: ICD-10-CM

## 2022-03-15 DIAGNOSIS — R00.1 BRADYCARDIA: ICD-10-CM

## 2022-03-15 PROCEDURE — 93296 REM INTERROG EVL PM/IDS: CPT | Performed by: INTERNAL MEDICINE

## 2022-03-15 PROCEDURE — 93294 REM INTERROG EVL PM/LDLS PM: CPT | Performed by: INTERNAL MEDICINE

## 2022-03-15 NOTE — PROGRESS NOTES
We received remote transmission from patient's dual chamber pacemaker monitor at home. Transmission shows normal sensing and pacing function. Pt sends frequent transmissions. NSVT and AT noted (pt does not appear to be on a beta blocker). Ap 49.2%   <0.1% (MVP On)  Echo 07.2021 showed EF of >65%. EP physician will review. See interrogation under cardiology tab in the [address] [place] [address] [address] field for more details. Will continue to monitor remotely.

## 2022-03-15 NOTE — LETTER
[address] [facility] [phone]  [address] Her[address]  [facility] ([place], [place])    Pacemaker/Defibrillator Clinic    03/15/22      Jane Snider  [address] [facility] [address] [facility] [address] [place]      Dear Jane Snider    This letter is to inform you that we received the transmission from your monitor at home that checks your implanted heart device. The next date your monitor will automatically transmit will be 6/14/22. If your report needs attention we will notify you. Your device and monitor are wireless and automatic, but please periodically check your monitor is still plugged in to the electrical outlet. This will help to ensure successful automatic transmissions in the future. Also, the monitor needs to be close to you while sleeping at night. You do not need to send a transmission unless instructed to do so by the office. **PLEASE NOTE** that our [facility] policy and processes are changing to ensure a more seamless approach for all parties involved, allowing more time for our nurses to address patient issues and concerns. We will no longer be sending letters for NORMAL remote transmissions. You will be contacted by phone if your transmission requires attention (as previously done), and letters will only be sent regarding monitor disconnections or missed transmissions if you are unable to be reached by phone. Please do not be alarmed by this new process, as we will continue to contact you if your transmission report requires attention. This will be your final \"remote received\" letter. From this point forward, the [facility] will be utilizing the no news is good news approach. As always, please feel free to contact your nurse with any questions or concerns. Thank you.     [place]

## 2022-06-14 ENCOUNTER — NURSE ONLY (OUTPATIENT)
Dept: CARDIOLOGY CLINIC | Age: 52
End: 2022-06-14

## 2022-06-14 DIAGNOSIS — I49.5 SSS (SICK SINUS SYNDROME) (HCC): ICD-10-CM

## 2022-06-14 DIAGNOSIS — Z95.0 PACEMAKER: ICD-10-CM

## 2022-06-15 PROCEDURE — 93296 REM INTERROG EVL PM/IDS: CPT | Performed by: INTERNAL MEDICINE

## 2022-06-15 PROCEDURE — 93294 REM INTERROG EVL PM/LDLS PM: CPT | Performed by: INTERNAL MEDICINE

## 2022-06-15 NOTE — PROGRESS NOTES
We received remote transmission from patient's dual chamber pacemaker monitor at home. Transmission shows normal sensing and pacing function. Pt sends frequent transmissions. No arrhythmias/ or events. Ap 93.0%   <0.1% (MVP On)  Echo 07.2021 showed EF of >65%. EP physician will review. See interrogation under cardiology tab in the [address] [place] [address] [address] field for more details. Will continue to monitor remotely.

## 2022-09-21 ENCOUNTER — NURSE ONLY (OUTPATIENT)
Dept: CARDIOLOGY CLINIC | Age: 52
End: 2022-09-21

## 2022-09-21 DIAGNOSIS — I49.5 SSS (SICK SINUS SYNDROME) (HCC): ICD-10-CM

## 2022-09-21 DIAGNOSIS — Z95.0 PACEMAKER: Primary | ICD-10-CM

## 2022-09-21 PROCEDURE — 93294 REM INTERROG EVL PM/LDLS PM: CPT | Performed by: INTERNAL MEDICINE

## 2022-09-21 PROCEDURE — 93296 REM INTERROG EVL PM/IDS: CPT | Performed by: INTERNAL MEDICINE

## 2022-09-22 NOTE — PROGRESS NOTES
We received remote transmission from patient's dual chamber pacemaker monitor at home. Transmission shows normal sensing and pacing function. Pt sends frequent transmissions. No arrhythmias/ or events. Ap 55.5%   <0.1% (MVP On)  Echo 07.2021 showed EF of >65%. EP physician will review. See interrogation under cardiology tab in the [address] [place] [address] [address] field for more details. Will continue to monitor remotely. (End of 91-day monitoring period 9/21/22).

## 2023-01-04 ENCOUNTER — NURSE ONLY (OUTPATIENT)
Dept: CARDIOLOGY CLINIC | Age: 53
End: 2023-01-04

## 2023-01-04 DIAGNOSIS — I49.5 SSS (SICK SINUS SYNDROME) (HCC): ICD-10-CM

## 2023-01-04 DIAGNOSIS — Z95.0 PACEMAKER: Primary | ICD-10-CM

## 2023-01-05 ENCOUNTER — TELEPHONE (OUTPATIENT)
Dept: CARDIOLOGY CLINIC | Age: 53
End: 2023-01-05

## 2023-01-05 NOTE — TELEPHONE ENCOUNTER
Patient called inquiring why she doesn't feel her monitor. Patient also stated that she's feeling some type of pain around the pacemaker.   Patient wants a call back from Elvira Villanueva at [phone] cell number

## 2023-01-13 NOTE — TELEPHONE ENCOUNTER
Spoke with pt. She is no longer experiencing any discomfort from her PPM site. Pt had recent remote transmission on 1/4/23. She has no f/u scheduled with EP. Offered to schedule f/u, but she lives in [place]. She comes to our office during the summer when she visits her sister. She is not sure exactly when she'll be here this summer, but will call and schedule a f/u with once she knows for sure.

## 2023-01-20 NOTE — PROGRESS NOTES
Remote transmission received from patient's dual chamber pacemaker monitor at home. Transmission shows normal sensing and pacing function. Pt sends frequent transmissions. Noted brief AT. Ap 79.9%   <0.1% (MVP On)  PVCs 20.2/hr  Echo 07.2021 showed EF of >65%. End of 91-day monitoring period 1/4/23. EP physician will review. See interrogation under cardiology tab in the [address] [place] [address] [address] field for more details. Will continue to monitor remotely.

## 2023-04-12 ENCOUNTER — NURSE ONLY (OUTPATIENT)
Dept: CARDIOLOGY CLINIC | Age: 53
End: 2023-04-12

## 2023-04-12 DIAGNOSIS — R00.1 BRADYCARDIA: ICD-10-CM

## 2023-04-12 DIAGNOSIS — Z95.0 PACEMAKER: Primary | ICD-10-CM

## 2023-04-12 DIAGNOSIS — I49.5 SSS (SICK SINUS SYNDROME) (HCC): ICD-10-CM

## 2023-04-21 NOTE — PROGRESS NOTES
Remote transmission received from patient's dual chamber pacemaker monitor at home. Transmission shows normal sensing and pacing function. Noted brief NSVT. Ap 84.6%   <0.1% (MVP On)  PVCs 6.6/hr  Echo 07.2021 showed EF of >65%. End of 91-day monitoring period 4/12/23. EP physician will review. See interrogation under cardiology tab in the [address] [place] [address] [address] field for more details. Will continue to monitor remotely.

## 2023-10-11 PROCEDURE — 93294 REM INTERROG EVL PM/LDLS PM: CPT | Performed by: INTERNAL MEDICINE

## 2023-10-11 PROCEDURE — 93296 REM INTERROG EVL PM/IDS: CPT | Performed by: INTERNAL MEDICINE

## 2024-01-10 PROCEDURE — 93294 REM INTERROG EVL PM/LDLS PM: CPT | Performed by: INTERNAL MEDICINE

## 2024-01-10 PROCEDURE — 93296 REM INTERROG EVL PM/IDS: CPT | Performed by: INTERNAL MEDICINE

## 2024-05-21 NOTE — PLAN OF CARE
Problem: Falls - Risk of:  Goal: Will remain free from falls  Description: Will remain free from falls  Outcome: Met This Shift  Note: Up ad francis , junctional on tel asymptomatic, gait steady, side rails up x 2 call light in reach bed in low position bed alarm on, pt calls when needing assist.     Problem: Falls - Risk of:  Goal: Absence of physical injury  Description: Absence of physical injury  Outcome: Met This Shift     Problem: Pain:  Description: Pain management should include both nonpharmacologic and pharmacologic interventions. Goal: Pain level will decrease  Description: Pain level will decrease  Outcome: Met This Shift  Note: Denies any     Problem: Pain:  Description: Pain management should include both nonpharmacologic and pharmacologic interventions. Goal: Control of acute pain  Description: Control of acute pain  Outcome: Met This Shift     Problem: Pain:  Description: Pain management should include both nonpharmacologic and pharmacologic interventions.   Goal: Control of chronic pain  Description: Control of chronic pain  Outcome: Met This Shift     Problem: Discharge Planning:  Goal: Discharged to appropriate level of care  Description: Discharged to appropriate level of care  Outcome: Met This Shift  Note: Home when medically stable     Problem: Isolation Precautions - Risk of Spread of Infection  Goal: Prevent transmission of infection  Outcome: Completed     Problem: Loneliness or Risk for Loneliness  Goal: Demonstrate positive use of time alone when socialization is not possible  Outcome: Completed     Problem: Fatigue  Goal: Verbalize increase energy and improved vitality  Outcome: Completed no